# Patient Record
Sex: FEMALE | Race: WHITE | Employment: FULL TIME | URBAN - METROPOLITAN AREA
[De-identification: names, ages, dates, MRNs, and addresses within clinical notes are randomized per-mention and may not be internally consistent; named-entity substitution may affect disease eponyms.]

---

## 2017-01-17 RX ORDER — TOPIRAMATE 25 MG/1
TABLET ORAL
Qty: 270 TABLET | Refills: 1 | Status: SHIPPED | OUTPATIENT
Start: 2017-01-17 | End: 2017-07-13

## 2017-01-17 NOTE — TELEPHONE ENCOUNTER
Medication: Topirimate    Date of last refill: 10/24/16 # 270  [place]     Last office visit: 11/7/2016  Due back to clinic per last office note:  RTN in 3 months  Date next office visit scheduled:  Future Appointments

## 2017-02-01 ENCOUNTER — TELEPHONE (OUTPATIENT)
Dept: NEUROLOGY | Facility: CLINIC | Age: 47
End: 2017-02-01

## 2017-02-01 NOTE — TELEPHONE ENCOUNTER
Predetermination received from [facility] see encounter 1- no end date given. Called [employer] per Mark Smith benefits will have to be verified for 2-2017 and patient consent is needed before Botox can be shipped. Benefits verified started today.

## 2017-02-08 NOTE — TELEPHONE ENCOUNTER
Called [employer] and spoke with Libia Joya she will call patient today to get consent and co-pay and then will call me back to set up delivery.

## 2017-02-14 NOTE — TELEPHONE ENCOUNTER
Received Botox 1 200 unit valve. Lot # [id number] exp date 09/2019. RX# [id number]. Placed in refrigerator.  Appt schedule 02/15/17

## 2017-02-15 ENCOUNTER — OFFICE VISIT (OUTPATIENT)
Dept: NEUROLOGY | Facility: CLINIC | Age: 47
End: 2017-02-15

## 2017-02-15 VITALS
SYSTOLIC BLOOD PRESSURE: 124 MMHG | BODY MASS INDEX: 22.16 KG/M2 | RESPIRATION RATE: 16 BRPM | DIASTOLIC BLOOD PRESSURE: 60 MMHG | HEART RATE: 72 BPM | HEIGHT: 65 IN | WEIGHT: 133 LBS

## 2017-02-15 DIAGNOSIS — G43.709 CHRONIC MIGRAINE WITHOUT AURA WITHOUT STATUS MIGRAINOSUS, NOT INTRACTABLE: Primary | ICD-10-CM

## 2017-02-15 PROCEDURE — 64615 CHEMODENERV MUSC MIGRAINE: CPT | Performed by: OTHER

## 2017-02-15 NOTE — PATIENT INSTRUCTIONS
Refill policies:    • Allow 2 business days for refills; controlled substances may take longer.   • Contact your pharmacy at least 5 days prior to running out of medication and have them send an electronic request or submit request through the “request re your physician has recommended that you have a procedure or additional testing performed. [facility]) will contact your insurance carrier to obtain pre-certification or prior authorization.     Unfortunately, PAM has seen an increas

## 2017-02-15 NOTE — PROCEDURES
[employer] with [employer]  2/15/2017  CHRONIC MIGRAINE - BOTOX Injection  CPT: 69421    Beaulieu  is a(n) 50year old female with chronic migraine. Patient is here for Botox injection. Expect response to start on the second or going into the 3rd week  Use ice packs and Tylenol ES if with pain at injections sites  Report any signs of infection or inflammation at site of injection  Use migraine medications the same was as before    Naldo Conner

## 2017-04-24 ENCOUNTER — TELEPHONE (OUTPATIENT)
Dept: NEUROLOGY | Facility: CLINIC | Age: 47
End: 2017-04-24

## 2017-04-24 DIAGNOSIS — G43.709 CHRONIC MIGRAINE WITHOUT AURA WITHOUT STATUS MIGRAINOSUS, NOT INTRACTABLE: Primary | ICD-10-CM

## 2017-04-24 NOTE — TELEPHONE ENCOUNTER
Please send a prescription to [facility]  Pharmacy:    Botox 200 units, 3 refills      Si units to be administered by MD into multiple sites of head, neck and scalp every 3 months for chronic migraine prophylaxis. Appointment 6-.

## 2017-05-17 ENCOUNTER — OFFICE VISIT (OUTPATIENT)
Dept: NEUROLOGY | Facility: CLINIC | Age: 47
End: 2017-05-17

## 2017-05-17 VITALS — RESPIRATION RATE: 18 BRPM | SYSTOLIC BLOOD PRESSURE: 116 MMHG | DIASTOLIC BLOOD PRESSURE: 74 MMHG | HEART RATE: 72 BPM

## 2017-05-17 DIAGNOSIS — G43.709 CHRONIC MIGRAINE WITHOUT AURA WITHOUT STATUS MIGRAINOSUS, NOT INTRACTABLE: Primary | ICD-10-CM

## 2017-05-17 PROCEDURE — 64615 CHEMODENERV MUSC MIGRAINE: CPT | Performed by: OTHER

## 2017-05-17 RX ORDER — FROVATRIPTAN SUCCINATE 2.5 MG/1
TABLET, FILM COATED ORAL
Refills: 4 | COMMUNITY
Start: 2017-04-16 | End: 2017-11-10

## 2017-05-17 NOTE — PATIENT INSTRUCTIONS
Refill policies:    • Allow 2 business days for refills; controlled substances may take longer.   • Contact your pharmacy at least 5 days prior to running out of medication and have them send an electronic request or submit request through the “request re insurance carrier to obtain pre-certification or prior authorization. Unfortunately, PAM has seen an increase in denial of payment even though the procedure/test has been pre-certified.   You are strongly encouraged to contact your insurance carrier to v

## 2017-05-17 NOTE — PROGRESS NOTES
[facility] with [employer]  5/17/2017  CHRONIC MIGRAINE - BOTOX Injection  CPT: 82385    Parker Cummings is a(n) 50year old female with chronic migraine. Patient is here for Botox injection. Chronic migraine without aura without status migrainosus, not intractable  (primary encounter diagnosis)    Post injections instructions:  Expect response to start on the second or going into the 3rd week  Use ice packs and Tylenol ES if with pain at injec

## 2017-06-12 ENCOUNTER — LAB ENCOUNTER (OUTPATIENT)
Dept: LAB | Age: 47
End: 2017-06-12
Attending: INTERNAL MEDICINE
Payer: COMMERCIAL

## 2017-06-12 ENCOUNTER — TELEPHONE (OUTPATIENT)
Dept: NEUROLOGY | Facility: CLINIC | Age: 47
End: 2017-06-12

## 2017-06-12 DIAGNOSIS — L40.50 PSORIATIC ARTHRITIS (HCC): Primary | ICD-10-CM

## 2017-06-12 PROCEDURE — 85025 COMPLETE CBC W/AUTO DIFF WBC: CPT

## 2017-06-12 PROCEDURE — 80053 COMPREHEN METABOLIC PANEL: CPT

## 2017-07-13 RX ORDER — TOPIRAMATE 25 MG/1
TABLET ORAL
Qty: 270 TABLET | Refills: 1 | Status: SHIPPED | OUTPATIENT
Start: 2017-07-13 | End: 2017-12-23

## 2017-07-13 NOTE — TELEPHONE ENCOUNTER
Medication: Topiramate 25 mg  Date of last refill: 01/17/17 # 270 with 1 addt refill  [place]     Last office visit: 5/17/2017  Due back to clinic per last office note:  RTN in 3 months by 08/17/17  Date next office visi

## 2017-07-18 ENCOUNTER — TELEPHONE (OUTPATIENT)
Dept: SURGERY | Facility: CLINIC | Age: 47
End: 2017-07-18

## 2017-07-18 NOTE — TELEPHONE ENCOUNTER
For medical pre-d with [employer] need reduction in headaches since start of botox, as well as reduction inf frequency and intensity of headaches.

## 2017-07-19 NOTE — TELEPHONE ENCOUNTER
Botox Re-authorization Questionnaire:  Has the number of headache days per month decreased since Botox? Yes - decrease until she is due for next Botox   If yes, by what percentage? > 50 % improvement  Has the intensity of migraines decreased since Botox?  Annalee Flower

## 2017-08-02 NOTE — TELEPHONE ENCOUNTER
Spoke to rep [employer]. Needs pt consent    Tentatively delivery scheduled on 08/09/17.     Contacted pt and LMOM advising to contact [employer] to give consent

## 2017-08-02 NOTE — TELEPHONE ENCOUNTER
Received fax. Based on the documentation submitted, [id number] and [id number] for Botox injection for chronic migraine headaches are covered under the member's plan.

## 2017-08-14 NOTE — TELEPHONE ENCOUNTER
Called [employer]. Spoke to rep Carl Ang. Obtained pt consent and copay.  Scheduled delivery on 08/16/17 to [facility]

## 2017-08-16 NOTE — TELEPHONE ENCOUNTER
ONe 200 unit vial of Botox received in [facility]. Lot # [id number], exp March 2020. Pt has appt for 8/30/17. Botox placed in refrigerator.

## 2017-08-30 ENCOUNTER — OFFICE VISIT (OUTPATIENT)
Dept: NEUROLOGY | Facility: CLINIC | Age: 47
End: 2017-08-30

## 2017-08-30 VITALS
HEART RATE: 68 BPM | SYSTOLIC BLOOD PRESSURE: 110 MMHG | BODY MASS INDEX: 22.16 KG/M2 | RESPIRATION RATE: 16 BRPM | WEIGHT: 133 LBS | DIASTOLIC BLOOD PRESSURE: 60 MMHG | HEIGHT: 65 IN

## 2017-08-30 DIAGNOSIS — G43.709 CHRONIC MIGRAINE WITHOUT AURA WITHOUT STATUS MIGRAINOSUS, NOT INTRACTABLE: Primary | ICD-10-CM

## 2017-08-30 PROCEDURE — 64615 CHEMODENERV MUSC MIGRAINE: CPT | Performed by: OTHER

## 2017-08-30 NOTE — PROCEDURES
[employer] with [employer]  8/30/2017  CHRONIC MIGRAINE - BOTOX Injection  CPT: 56316    Jody Elizalde is a(n) 50year old female with chronic migraine. Patient is here for Botox injection. Discarded:  45 units          IMPRESSION:  Chronic migraine without aura without status migrainosus, not intractable  (primary encounter diagnosis)    Post injections instructions:  Expect response to start on the second or going into the 3rd week  Use ice

## 2017-08-30 NOTE — PATIENT INSTRUCTIONS
Refill policies:    • Allow 2-3 business days for refills; controlled substances may take longer.   • Contact your pharmacy at least 5 days prior to running out of medication and have them send an electronic request or submit request through the [facility] have a procedure or additional testing performed. [facility]) will contact your insurance carrier to obtain pre-certification or prior authorization.     Unfortunately, PAM has seen an increase in denial of payment even though the p

## 2017-11-10 DIAGNOSIS — G43.709 CHRONIC MIGRAINE WITHOUT AURA WITHOUT STATUS MIGRAINOSUS, NOT INTRACTABLE: ICD-10-CM

## 2017-11-10 DIAGNOSIS — G43.709 CHRONIC MIGRAINE WITHOUT AURA WITHOUT STATUS MIGRAINOSUS, NOT INTRACTABLE: Primary | ICD-10-CM

## 2017-11-10 RX ORDER — FROVATRIPTAN SUCCINATE 2.5 MG/1
TABLET, FILM COATED ORAL
Qty: 12 TABLET | Refills: 1 | Status: SHIPPED | OUTPATIENT
Start: 2017-11-10 | End: 2017-11-10

## 2017-11-10 NOTE — TELEPHONE ENCOUNTER
Medication: Frova    Date of last refill: 4/16/2017  [place] medication    Last office visit: 8/30/2017  Due back to clinic per last office note:  RTC for Botox  Date next office visit scheduled:  Future Appo

## 2017-11-13 RX ORDER — FROVATRIPTAN SUCCINATE 2.5 MG/1
TABLET, FILM COATED ORAL
Qty: 36 TABLET | Refills: 1 | Status: SHIPPED | OUTPATIENT
Start: 2017-11-13 | End: 2018-09-14

## 2017-11-21 ENCOUNTER — TELEPHONE (OUTPATIENT)
Dept: SURGERY | Facility: CLINIC | Age: 47
End: 2017-11-21

## 2017-11-21 NOTE — TELEPHONE ENCOUNTER
Called . Spoke to rep [employer]. Started process to verify benefits and once completed will contact patient for consent to schedule delivery.   Call duration 21:29    Also contacted Lilibeth Perez with [employer] to put a rush on verification of benefits to schedule deli

## 2017-11-27 NOTE — TELEPHONE ENCOUNTER
Called [employer]. Spoke to [employer]. Scheduled for delivery on 11/28/17 to [facility]. Confirmed delivery address.   Call duration 11:33

## 2017-12-04 ENCOUNTER — OFFICE VISIT (OUTPATIENT)
Dept: NEUROLOGY | Facility: CLINIC | Age: 47
End: 2017-12-04

## 2017-12-04 VITALS
RESPIRATION RATE: 16 BRPM | HEIGHT: 65 IN | HEART RATE: 77 BPM | WEIGHT: 137 LBS | BODY MASS INDEX: 22.82 KG/M2 | SYSTOLIC BLOOD PRESSURE: 113 MMHG | DIASTOLIC BLOOD PRESSURE: 64 MMHG

## 2017-12-04 DIAGNOSIS — G43.709 CHRONIC MIGRAINE WITHOUT AURA WITHOUT STATUS MIGRAINOSUS, NOT INTRACTABLE: Primary | ICD-10-CM

## 2017-12-04 PROCEDURE — 64615 CHEMODENERV MUSC MIGRAINE: CPT | Performed by: OTHER

## 2017-12-04 RX ORDER — LIOTHYRONINE SODIUM 25 UG/1
12.5 TABLET ORAL DAILY
COMMUNITY
End: 2020-07-29

## 2017-12-04 RX ORDER — FOLIC ACID 1 MG/1
2 TABLET ORAL DAILY
COMMUNITY
End: 2018-07-05

## 2017-12-04 NOTE — PATIENT INSTRUCTIONS
Refill policies:    • Allow 2-3 business days for refills; controlled substances may take longer.   • Contact your pharmacy at least 5 days prior to running out of medication and have them send an electronic request or submit request through the [facility] have a procedure or additional testing performed. [facility]) will contact your insurance carrier to obtain pre-certification or prior authorization.     Unfortunately, PAM has seen an increase in denial of payment even though the p

## 2017-12-04 NOTE — PROCEDURES
[employer] with [employer]  12/4/2017  CHRONIC MIGRAINE - BOTOX Injection  CPT: 39546    Tammi Jade is a(n) 50year old female with chronic migraine. Patient is here for Botox injection. Discarded:  35 units          IMPRESSION:  Chronic migraine without aura without status migrainosus, not intractable  (primary encounter diagnosis)    Post injections instructions:  Expect response to start on the second or going into the 3rd week  Use ice

## 2017-12-11 PROCEDURE — 87624 HPV HI-RISK TYP POOLED RSLT: CPT | Performed by: OBSTETRICS & GYNECOLOGY

## 2017-12-11 PROCEDURE — 88175 CYTOPATH C/V AUTO FLUID REDO: CPT | Performed by: OBSTETRICS & GYNECOLOGY

## 2017-12-18 ENCOUNTER — LAB ENCOUNTER (OUTPATIENT)
Dept: LAB | Age: 47
End: 2017-12-18
Attending: INTERNAL MEDICINE
Payer: COMMERCIAL

## 2017-12-18 DIAGNOSIS — L40.50 PSORIATIC ARTHROPATHY (HCC): Primary | ICD-10-CM

## 2017-12-18 PROCEDURE — 85025 COMPLETE CBC W/AUTO DIFF WBC: CPT

## 2017-12-18 PROCEDURE — 81291 MTHFR GENE: CPT

## 2017-12-18 PROCEDURE — 80053 COMPREHEN METABOLIC PANEL: CPT

## 2017-12-26 RX ORDER — TOPIRAMATE 25 MG/1
TABLET ORAL
Qty: 270 TABLET | Refills: 0 | Status: SHIPPED | OUTPATIENT
Start: 2017-12-26 | End: 2018-04-21

## 2018-02-20 ENCOUNTER — TELEPHONE (OUTPATIENT)
Dept: SURGERY | Facility: CLINIC | Age: 48
End: 2018-02-20

## 2018-02-20 DIAGNOSIS — G43.709 CHRONIC MIGRAINE WITHOUT AURA WITHOUT STATUS MIGRAINOSUS, NOT INTRACTABLE: Primary | ICD-10-CM

## 2018-02-20 NOTE — TELEPHONE ENCOUNTER
Please send a prescription to [facility]:    Botox 200 units, 3 refills      Si units to be administered by MD into multiple sites of head, neck and scalp every 3 months for chronic migraine prophylaxis.      Need by date 18

## 2018-02-26 ENCOUNTER — TELEPHONE (OUTPATIENT)
Dept: SURGERY | Facility: CLINIC | Age: 48
End: 2018-02-26

## 2018-02-26 NOTE — TELEPHONE ENCOUNTER
Justo Delacruz from [employer] and said that this is setup for delivery they just need copay and consent. I called patient left a message.     I called [facility] and spoke with [facility] OF [place] he said patient gave half of the co-pay but not consent, she needs to call back t

## 2018-02-27 NOTE — TELEPHONE ENCOUNTER
Called [facility] and spoke with Sally Half patient has given consent and she will have this shipped out today for deliver to the [place] office on 2/28/18. Confirmed address and hours.

## 2018-03-07 ENCOUNTER — OFFICE VISIT (OUTPATIENT)
Dept: NEUROLOGY | Facility: CLINIC | Age: 48
End: 2018-03-07

## 2018-03-07 VITALS
DIASTOLIC BLOOD PRESSURE: 62 MMHG | HEART RATE: 80 BPM | SYSTOLIC BLOOD PRESSURE: 121 MMHG | WEIGHT: 136 LBS | HEIGHT: 66 IN | BODY MASS INDEX: 21.86 KG/M2 | RESPIRATION RATE: 16 BRPM

## 2018-03-07 DIAGNOSIS — G43.709 CHRONIC MIGRAINE WITHOUT AURA WITHOUT STATUS MIGRAINOSUS, NOT INTRACTABLE: Primary | ICD-10-CM

## 2018-03-07 PROCEDURE — 64615 CHEMODENERV MUSC MIGRAINE: CPT | Performed by: OTHER

## 2018-03-07 NOTE — PROCEDURES
[employer] with [employer]  3/7/2018  CHRONIC MIGRAINE - BOTOX Injection  CPT: 22761    Brain Corrina is a(n) 46year old female with chronic migraine. Patient is here for Botox injection. Discarded:  45 units          IMPRESSION:  Chronic migraine without aura without status migrainosus, not intractable  (primary encounter diagnosis)    Post injections instructions:  Expect response to start on the second or going into the 3rd week  Use ice

## 2018-03-07 NOTE — PATIENT INSTRUCTIONS
Refill policies:    • Allow 2-3 business days for refills; controlled substances may take longer.   • Contact your pharmacy at least 5 days prior to running out of medication and have them send an electronic request or submit request through the [facility] recommended that you have a procedure or additional testing performed. [facility]) will contact your insurance carrier to obtain pre-certification or prior authorization.     Unfortunately, [facility] has seen an increase in denial of paym

## 2018-04-23 RX ORDER — TOPIRAMATE 25 MG/1
TABLET ORAL
Qty: 270 TABLET | Refills: 3 | Status: SHIPPED | OUTPATIENT
Start: 2018-04-23 | End: 2019-05-02

## 2018-06-06 ENCOUNTER — TELEPHONE (OUTPATIENT)
Dept: SURGERY | Facility: CLINIC | Age: 48
End: 2018-06-06

## 2018-06-06 NOTE — TELEPHONE ENCOUNTER
Called [facility] and spoke with [place] Botox will be deliver to [place] on 6/7/18. Patient has a zero copay and has given a blanket consent. Confirmed address and hours.

## 2018-06-07 NOTE — TELEPHONE ENCOUNTER
Had received botox 200 units Lot #[id number] Exp Date 12/2020  Rx # [id number]  Patient has an appointment on 06/11/2018

## 2018-06-11 ENCOUNTER — OFFICE VISIT (OUTPATIENT)
Dept: NEUROLOGY | Facility: CLINIC | Age: 48
End: 2018-06-11

## 2018-06-11 VITALS
HEIGHT: 66 IN | SYSTOLIC BLOOD PRESSURE: 120 MMHG | BODY MASS INDEX: 21.86 KG/M2 | RESPIRATION RATE: 16 BRPM | DIASTOLIC BLOOD PRESSURE: 68 MMHG | WEIGHT: 136 LBS | HEART RATE: 77 BPM

## 2018-06-11 DIAGNOSIS — G43.709 CHRONIC MIGRAINE WITHOUT AURA WITHOUT STATUS MIGRAINOSUS, NOT INTRACTABLE: Primary | ICD-10-CM

## 2018-06-11 PROCEDURE — 64615 CHEMODENERV MUSC MIGRAINE: CPT | Performed by: OTHER

## 2018-06-11 RX ORDER — LEVOCETIRIZINE DIHYDROCHLORIDE 5 MG/1
5 TABLET, FILM COATED ORAL EVERY EVENING
COMMUNITY

## 2018-06-11 NOTE — PATIENT INSTRUCTIONS
Refill policies:    • Allow 2-3 business days for refills; controlled substances may take longer.   • Contact your pharmacy at least 5 days prior to running out of medication and have them send an electronic request or submit request through the “request re entire amount billed. Precertification and Prior Authorizations: If your physician has recommended that you have a procedure or additional testing performed.   [facility]) will contact your insurance carrier to obtain pre-certi numerical 0-10

## 2018-06-11 NOTE — PROCEDURES
[facility] with [employer]  6/11/2018  CHRONIC MIGRAINE - BOTOX Injection  CPT: 88837    Selene Mccracken is a(n) 46year old female with chronic migraine. Patient is here for Botox injection. Discarded:  45 units          IMPRESSION:  Chronic migraine without aura without status migrainosus, not intractable  (primary encounter diagnosis)    Post injections instructions:  Expect response to start on the second or going into the 3rd week  Use ice

## 2018-06-12 ENCOUNTER — TELEPHONE (OUTPATIENT)
Dept: NEUROLOGY | Facility: CLINIC | Age: 48
End: 2018-06-12

## 2018-06-12 DIAGNOSIS — G43.709 CHRONIC MIGRAINE WITHOUT AURA WITHOUT STATUS MIGRAINOSUS, NOT INTRACTABLE: Primary | ICD-10-CM

## 2018-06-12 NOTE — TELEPHONE ENCOUNTER
At office visit patient completed paperwork to initiate Neville Doherty.     PA initiated, see referral.

## 2018-06-12 NOTE — TELEPHONE ENCOUNTER
Received notification from [employer]: this product does not require authorization at this time, and may be covered at the pharmacy in accordance with your benefit plan. Start form for Aimovig faxed to [address] [place] [address], [facility] rec'd. Paperwork to file.  Epic upd

## 2018-06-19 PROCEDURE — 87624 HPV HI-RISK TYP POOLED RSLT: CPT | Performed by: OBSTETRICS & GYNECOLOGY

## 2018-06-19 PROCEDURE — 88175 CYTOPATH C/V AUTO FLUID REDO: CPT | Performed by: OBSTETRICS & GYNECOLOGY

## 2018-06-26 PROBLEM — L40.50 PSORIATIC ARTHRITIS (HCC): Status: ACTIVE | Noted: 2018-06-26

## 2018-07-06 ENCOUNTER — TELEPHONE (OUTPATIENT)
Dept: NEUROLOGY | Facility: CLINIC | Age: 48
End: 2018-07-06

## 2018-07-06 DIAGNOSIS — G43.709 CHRONIC MIGRAINE WITHOUT AURA WITHOUT STATUS MIGRAINOSUS, NOT INTRACTABLE: Primary | ICD-10-CM

## 2018-07-06 RX ORDER — ERENUMAB-AOOE 70 MG/ML
70 INJECTION SUBCUTANEOUS
Qty: 1 PEN | Refills: 11 | Status: SHIPPED | OUTPATIENT
Start: 2018-07-06 | End: 2019-06-07 | Stop reason: DRUGHIGH

## 2018-07-27 ENCOUNTER — TELEPHONE (OUTPATIENT)
Dept: NEUROLOGY | Facility: CLINIC | Age: 48
End: 2018-07-27

## 2018-07-27 NOTE — TELEPHONE ENCOUNTER
Received a call from [facility] to verify diagnosis code for Botox therapy. Diagnosis code provided. They inquired if medication has an active PA on file and if not, please fax clinicals to [phone]. Will forward inquiry to referrals coordinator.

## 2018-07-30 PROCEDURE — 83001 ASSAY OF GONADOTROPIN (FSH): CPT | Performed by: OBSTETRICS & GYNECOLOGY

## 2018-07-30 PROCEDURE — 82670 ASSAY OF TOTAL ESTRADIOL: CPT | Performed by: OBSTETRICS & GYNECOLOGY

## 2018-07-30 PROCEDURE — 83002 ASSAY OF GONADOTROPIN (LH): CPT | Performed by: OBSTETRICS & GYNECOLOGY

## 2018-09-04 ENCOUNTER — TELEPHONE (OUTPATIENT)
Dept: SURGERY | Facility: CLINIC | Age: 48
End: 2018-09-04

## 2018-09-04 NOTE — TELEPHONE ENCOUNTER
Called [facility] and spoke with verónica to confirmed Botox goes through [facility], he check and yes it does. Call reference #[id number]. Time on call 16:43    Called [facility] and spoke with [facility] she sent this as a stat I can call back in 24 hours for delivery.

## 2018-09-05 ENCOUNTER — TELEPHONE (OUTPATIENT)
Dept: SURGERY | Facility: CLINIC | Age: 48
End: 2018-09-05

## 2018-09-05 NOTE — TELEPHONE ENCOUNTER
Yaakov Bowers will hold off on Botox at this time as her headaches are well controlled. Appointment cancelled and she will call for future appointment to assess headache control.

## 2018-09-05 NOTE — TELEPHONE ENCOUNTER
I called patient to call [facility] and give consent by noon today to be able to have the Botox delivered in time for her 9/12/18 visit. She said that she was not sure if she should have the Botox because the Scott Dunlap is helping control her headaches.  I exp

## 2018-09-14 DIAGNOSIS — G43.709 CHRONIC MIGRAINE WITHOUT AURA WITHOUT STATUS MIGRAINOSUS, NOT INTRACTABLE: ICD-10-CM

## 2018-09-17 DIAGNOSIS — G43.709 CHRONIC MIGRAINE WITHOUT AURA WITHOUT STATUS MIGRAINOSUS, NOT INTRACTABLE: ICD-10-CM

## 2018-09-17 RX ORDER — FROVATRIPTAN SUCCINATE 2.5 MG/1
TABLET, FILM COATED ORAL
Qty: 180 TABLET | Refills: 0 | OUTPATIENT
Start: 2018-09-17

## 2018-09-17 RX ORDER — FROVATRIPTAN SUCCINATE 2.5 MG/1
TABLET, FILM COATED ORAL
Qty: 36 TABLET | Refills: 0 | Status: SHIPPED | OUTPATIENT
Start: 2018-09-17 | End: 2019-01-27

## 2018-09-17 NOTE — TELEPHONE ENCOUNTER
Medication: Frovatriptan 2.5 g    Date of last refill: 11/13/17 with 1 addt refills # 36  [place]     Last office visit: 6/11/2018  Due back to clinic per last office note: RTN in 3 months  Date next office visit schedul

## 2018-12-12 ENCOUNTER — OFFICE VISIT (OUTPATIENT)
Dept: NEUROLOGY | Facility: CLINIC | Age: 48
End: 2018-12-12
Payer: COMMERCIAL

## 2018-12-12 VITALS
HEART RATE: 78 BPM | SYSTOLIC BLOOD PRESSURE: 102 MMHG | DIASTOLIC BLOOD PRESSURE: 60 MMHG | WEIGHT: 135 LBS | HEIGHT: 66 IN | BODY MASS INDEX: 21.69 KG/M2 | RESPIRATION RATE: 16 BRPM

## 2018-12-12 DIAGNOSIS — G43.709 CHRONIC MIGRAINE WITHOUT AURA WITHOUT STATUS MIGRAINOSUS, NOT INTRACTABLE: Primary | ICD-10-CM

## 2018-12-12 PROCEDURE — 99213 OFFICE O/P EST LOW 20 MIN: CPT | Performed by: OTHER

## 2018-12-12 NOTE — PATIENT INSTRUCTIONS
Refill policies:    • Allow 2-3 business days for refills; controlled substances may take longer.   • Contact your pharmacy at least 5 days prior to running out of medication and have them send an electronic request or submit request through the “request re entire amount billed. Precertification and Prior Authorizations: If your physician has recommended that you have a procedure or additional testing performed.   [facility]) will contact your insurance carrier to obtain pre-certi

## 2018-12-13 NOTE — PROGRESS NOTES
[facility] with [address]  1/22/1970  Primary Care Provider:  Kristel Pineda    12/12/2018  Accompanied visit:  (x) No ( ) yes, by:      46year old yo patient being seen for: nails Monday through Friday BID, Disp: 60 g, Rfl: 3  •  Calcipotriene 0.005 % External Ointment, Apply to nails daily Saturday and Sunday. , Disp: 60 g, Rfl: 3  •  Bimatoprost 0.03 % External Solution, Place into both eyes nightly., Disp: , Rfl:   •  Naprox migrainosus, not intractable  (primary encounter diagnosis)    Discussion on the case:  Continue AIMOVIG    Diagnostics/Orders:  same      (x) Discussed potential side effects of any treatment relevant to above.     After visit check out process by PSRs and

## 2019-01-27 DIAGNOSIS — G43.709 CHRONIC MIGRAINE WITHOUT AURA WITHOUT STATUS MIGRAINOSUS, NOT INTRACTABLE: ICD-10-CM

## 2019-01-28 RX ORDER — FROVATRIPTAN SUCCINATE 2.5 MG/1
TABLET, FILM COATED ORAL
Qty: 36 TABLET | Refills: 1 | Status: SHIPPED | OUTPATIENT
Start: 2019-01-28 | End: 2019-05-02

## 2019-01-28 NOTE — TELEPHONE ENCOUNTER
Medication: Frovatriptan 2.5 mg    Date of last refill:09/17/18  [place]     Last office visit: 12/12/2018  Due back to clinic per last office note:  RTN in 6 months  Date next office visit scheduled:    Future Appointme

## 2019-05-02 DIAGNOSIS — G43.709 CHRONIC MIGRAINE WITHOUT AURA WITHOUT STATUS MIGRAINOSUS, NOT INTRACTABLE: ICD-10-CM

## 2019-05-02 RX ORDER — FROVATRIPTAN SUCCINATE 2.5 MG/1
TABLET, FILM COATED ORAL
Qty: 36 TABLET | Refills: 1 | Status: SHIPPED | OUTPATIENT
Start: 2019-05-02 | End: 2020-06-15

## 2019-05-02 RX ORDER — TOPIRAMATE 25 MG/1
TABLET ORAL
Qty: 270 TABLET | Refills: 1 | Status: SHIPPED | OUTPATIENT
Start: 2019-05-02 | End: 2019-10-29

## 2019-05-03 RX ORDER — FROVATRIPTAN SUCCINATE 2.5 MG/1
TABLET, FILM COATED ORAL
Qty: 180 TABLET | Refills: 1 | OUTPATIENT
Start: 2019-05-03

## 2019-06-05 ENCOUNTER — OFFICE VISIT (OUTPATIENT)
Dept: NEUROLOGY | Facility: CLINIC | Age: 49
End: 2019-06-05
Payer: COMMERCIAL

## 2019-06-05 VITALS
HEIGHT: 66 IN | DIASTOLIC BLOOD PRESSURE: 60 MMHG | SYSTOLIC BLOOD PRESSURE: 117 MMHG | BODY MASS INDEX: 21.69 KG/M2 | HEART RATE: 77 BPM | RESPIRATION RATE: 16 BRPM | WEIGHT: 135 LBS

## 2019-06-05 DIAGNOSIS — M79.18 CERVICAL MYOFASCIAL PAIN SYNDROME: ICD-10-CM

## 2019-06-05 DIAGNOSIS — G43.709 CHRONIC MIGRAINE WITHOUT AURA WITHOUT STATUS MIGRAINOSUS, NOT INTRACTABLE: Primary | ICD-10-CM

## 2019-06-05 PROCEDURE — 99214 OFFICE O/P EST MOD 30 MIN: CPT | Performed by: OTHER

## 2019-06-05 NOTE — PROGRESS NOTES
[facility] with [facility]  Andrews Oro  1/22/1970  Primary Care Provider:  Saurabh Santana    6/5/2019  Accompanied visit:      (x) No.        44year old yo patient being seen for: •  Liothyronine Sodium 25 MCG Oral Tab, Take 12.5 mcg by mouth daily. , Disp: , Rfl:   •  Clobetasol Propionate 0.05 % External Ointment, Apply to nails Monday through Friday BID, Disp: 60 g, Rfl: 3  •  Calcipotriene 0.005 % External Ointment, Apply to na syndrome    Discussion plus Diagnostics & Treatment Orders:  Orders Placed This Encounter      Bimatoprost (LATISSE EX)          Sig: Apply topically.       Diclofenac Sodium (VOLTAREN) 1 % Transdermal Gel          Sig: Apply 2 g topically 4 (four) times da

## 2019-06-07 ENCOUNTER — TELEPHONE (OUTPATIENT)
Dept: NEUROLOGY | Facility: CLINIC | Age: 49
End: 2019-06-07

## 2019-06-07 DIAGNOSIS — G43.709 CHRONIC MIGRAINE WITHOUT AURA WITHOUT STATUS MIGRAINOSUS, NOT INTRACTABLE: Primary | ICD-10-CM

## 2019-06-07 NOTE — TELEPHONE ENCOUNTER
Aliyah Perez reports that she needs her Aimovig to be sent to [facility], not local.    Script forwarded as requested.

## 2019-07-31 NOTE — TELEPHONE ENCOUNTER
Received Fax from [employer] on AIMOVIG 140 MG/ML   Case #:[id number]      Your request for Aimovig 140 mg/ml auto injectors has been received by [employer], your pharmacy benefit manager.      This product does not require prior authorization

## 2019-09-10 PROCEDURE — 87624 HPV HI-RISK TYP POOLED RSLT: CPT | Performed by: OBSTETRICS & GYNECOLOGY

## 2019-09-10 PROCEDURE — 88175 CYTOPATH C/V AUTO FLUID REDO: CPT | Performed by: OBSTETRICS & GYNECOLOGY

## 2019-10-29 NOTE — TELEPHONE ENCOUNTER
Medication: Topiramate 25 mg    Date of last refill: 0502/2019 with 1 addt refill # 270      Last office visit: 6/5/2019  Due back to clinic per last office note: RTN in 6 months  Date next office visit scheduled:    Future Appointments   Date Time Provide

## 2019-10-30 RX ORDER — TOPIRAMATE 25 MG/1
TABLET ORAL
Qty: 270 TABLET | Refills: 1 | Status: SHIPPED | OUTPATIENT
Start: 2019-10-30 | End: 2020-04-29

## 2019-12-10 ENCOUNTER — OFFICE VISIT (OUTPATIENT)
Dept: NEUROLOGY | Facility: CLINIC | Age: 49
End: 2019-12-10
Payer: COMMERCIAL

## 2019-12-10 VITALS
RESPIRATION RATE: 16 BRPM | BODY MASS INDEX: 21.69 KG/M2 | WEIGHT: 135 LBS | HEIGHT: 66 IN | HEART RATE: 70 BPM | DIASTOLIC BLOOD PRESSURE: 59 MMHG | SYSTOLIC BLOOD PRESSURE: 113 MMHG

## 2019-12-10 DIAGNOSIS — M79.18 CERVICAL MYOFASCIAL PAIN SYNDROME: Primary | ICD-10-CM

## 2019-12-10 DIAGNOSIS — G43.709 CHRONIC MIGRAINE WITHOUT AURA WITHOUT STATUS MIGRAINOSUS, NOT INTRACTABLE: ICD-10-CM

## 2019-12-10 PROCEDURE — 99213 OFFICE O/P EST LOW 20 MIN: CPT | Performed by: OTHER

## 2019-12-10 PROCEDURE — 96372 THER/PROPH/DIAG INJ SC/IM: CPT | Performed by: OTHER

## 2019-12-10 RX ORDER — LIDOCAINE HYDROCHLORIDE 10 MG/ML
5 INJECTION, SOLUTION INFILTRATION; PERINEURAL ONCE
Status: COMPLETED | OUTPATIENT
Start: 2019-12-10 | End: 2019-12-10

## 2019-12-10 RX ORDER — LIDOCAINE HYDROCHLORIDE 10 MG/ML
5 INJECTION, SOLUTION INFILTRATION; PERINEURAL ONCE
Status: DISCONTINUED | OUTPATIENT
Start: 2019-12-10 | End: 2019-12-10

## 2019-12-10 RX ORDER — TRIAMCINOLONE ACETONIDE 40 MG/ML
40 INJECTION, SUSPENSION INTRA-ARTICULAR; INTRAMUSCULAR ONCE
Status: COMPLETED | OUTPATIENT
Start: 2019-12-10 | End: 2019-12-10

## 2019-12-11 NOTE — PROGRESS NOTES
[facility] with [facility]  Sandra Parent  1/22/1970  Primary Care Provider:  Mahendra Jones    12/10/2019  Accompanied visit:      (x) No.        44year old yo patient being seen for the skin every 30 (thirty) days.   , Disp: , Rfl:   •  LEVOTHYROXINE SODIUM 100 MCG Oral Tab, TAKE 1 TABLET BY MOUTH EVERY DAY AS DIRECTED, Disp: 90 tablet, Rfl: 0  •  Levocetirizine Dihydrochloride 5 MG Oral Tab, Take 5 mg by mouth every evening., Disp: , PM      RELEVANT LABS and other DATA reviewed.        Problem/s Identified this visit:   Cervical myofascial pain syndrome  (primary encounter diagnosis)    Discussion plus Diagnostics & Treatment Orders:  Advised that can continue Aimovig and start BOTOX \

## 2020-01-02 ENCOUNTER — TELEPHONE (OUTPATIENT)
Dept: NEUROLOGY | Facility: CLINIC | Age: 50
End: 2020-01-02

## 2020-01-02 DIAGNOSIS — G43.709 CHRONIC MIGRAINE WITHOUT AURA WITHOUT STATUS MIGRAINOSUS, NOT INTRACTABLE: Primary | ICD-10-CM

## 2020-01-02 NOTE — TELEPHONE ENCOUNTER
Prior authorization request received from [employer]. Prior authorization initiated. Case Key# [id number] pending. CaseId:[id number];Status:Approved; Review Type:Prior Auth; Coverage Start Date:01/01/2020; Coverage End Date:01/30/2021

## 2020-01-06 NOTE — TELEPHONE ENCOUNTER
Pt called to have Aimovig rx transferred to [facility]. Pt contacted [facility] and they told her they could fill it since they have supply. Pt would like it filled for 30 day only. Call pt if there are any questions.

## 2020-01-06 NOTE — TELEPHONE ENCOUNTER
Previous Rx sent to [facility] on 12/31/19 for a 90-day supply x3 refills. Pt would like to  a 1 month supply locally. New Rx signed as written order for 1 month supply.

## 2020-04-29 DIAGNOSIS — G43.709 CHRONIC MIGRAINE WITHOUT AURA WITHOUT STATUS MIGRAINOSUS, NOT INTRACTABLE: Primary | ICD-10-CM

## 2020-04-29 RX ORDER — TOPIRAMATE 25 MG/1
TABLET ORAL
Qty: 270 TABLET | Refills: 1 | Status: SHIPPED | OUTPATIENT
Start: 2020-04-29 | End: 2020-10-19

## 2020-04-29 NOTE — TELEPHONE ENCOUNTER
Medication: Topiramate    Date of last refill: 10/30/2019 (#270/1)  [place] medication    Last office visit: 12/10/2019  Due back to clinic per last office note:  RTC in 3 months  Date next office visit sched

## 2020-06-15 ENCOUNTER — TELEPHONE (OUTPATIENT)
Dept: NEUROLOGY | Facility: CLINIC | Age: 50
End: 2020-06-15

## 2020-06-15 DIAGNOSIS — G43.709 CHRONIC MIGRAINE WITHOUT AURA WITHOUT STATUS MIGRAINOSUS, NOT INTRACTABLE: ICD-10-CM

## 2020-06-15 RX ORDER — FROVATRIPTAN SUCCINATE 2.5 MG/1
TABLET, FILM COATED ORAL
Qty: 36 TABLET | Refills: 1 | Status: SHIPPED | OUTPATIENT
Start: 2020-06-15 | End: 2020-12-28

## 2020-06-15 NOTE — TELEPHONE ENCOUNTER
Medication: frovatriptan    Date of last refill: 5/2/19  [place]    Last office visit: 12/10/19  Due back to clinic per last office note:  Not addressed  Date next office visit scheduled:    Future Appointments   Date

## 2020-07-21 ENCOUNTER — OFFICE VISIT (OUTPATIENT)
Dept: NEUROLOGY | Facility: CLINIC | Age: 50
End: 2020-07-21
Payer: COMMERCIAL

## 2020-07-21 VITALS
RESPIRATION RATE: 16 BRPM | HEART RATE: 67 BPM | HEIGHT: 66 IN | WEIGHT: 135 LBS | TEMPERATURE: 98 F | BODY MASS INDEX: 21.69 KG/M2 | DIASTOLIC BLOOD PRESSURE: 64 MMHG | SYSTOLIC BLOOD PRESSURE: 117 MMHG

## 2020-07-21 DIAGNOSIS — M79.18 CERVICAL MYOFASCIAL PAIN SYNDROME: ICD-10-CM

## 2020-07-21 DIAGNOSIS — G43.709 CHRONIC MIGRAINE WITHOUT AURA WITHOUT STATUS MIGRAINOSUS, NOT INTRACTABLE: Primary | ICD-10-CM

## 2020-07-21 PROCEDURE — 3078F DIAST BP <80 MM HG: CPT | Performed by: OTHER

## 2020-07-21 PROCEDURE — 3008F BODY MASS INDEX DOCD: CPT | Performed by: OTHER

## 2020-07-21 PROCEDURE — 3074F SYST BP LT 130 MM HG: CPT | Performed by: OTHER

## 2020-07-21 PROCEDURE — 99213 OFFICE O/P EST LOW 20 MIN: CPT | Performed by: OTHER

## 2020-07-21 RX ORDER — HYDROXYCHLOROQUINE SULFATE 200 MG/1
200 TABLET, FILM COATED ORAL DAILY
COMMUNITY
End: 2021-10-19

## 2020-07-21 NOTE — PROGRESS NOTES
[facility] with [facility]  Kassi Moncada  1/22/1970  Primary Care Provider:  Roxine Hatchet    7/21/2020  Accompanied visit:      (x) No.        44year old yo patient being seen for: Rfl: 4  •  Bimatoprost (LATISSE EX), Apply topically. , Disp: , Rfl:   •  Diclofenac Sodium (VOLTAREN) 1 % Transdermal Gel, Apply 2 g topically 4 (four) times daily. , Disp: 1 Tube, Rfl: 5  •  VALACYCLOVIR HCL 1 G Oral Tab, TAKE 1 TABLET(1000 MG) BY MOUTH EV BMI 21.79 kg/m²   Looks stated age  [address] to relate events with fluent speech and intact comprehension  CN 2-12 OK  No motor and sensory focal deficit  Gait and coordination OK  Reflexes symmetric and no pathologic reflexes seen          RELE

## 2020-09-24 PROBLEM — M75.42 IMPINGEMENT SYNDROME OF LEFT SHOULDER: Status: ACTIVE | Noted: 2020-09-24

## 2020-09-25 DIAGNOSIS — G43.709 CHRONIC MIGRAINE WITHOUT AURA WITHOUT STATUS MIGRAINOSUS, NOT INTRACTABLE: ICD-10-CM

## 2020-09-25 RX ORDER — ERENUMAB-AOOE 140 MG/ML
INJECTION, SOLUTION SUBCUTANEOUS
Qty: 3 ML | Refills: 3 | Status: SHIPPED | OUTPATIENT
Start: 2020-09-25 | End: 2021-09-09

## 2020-09-25 NOTE — TELEPHONE ENCOUNTER
Medication: Aimovig 140 mg    Date of last refill: 12/31/2019 with 3 addt refills  [place]     Last office visit: 7/21/2020  Due back to clinic per last office note:  RTN in 3 months  Date next office visit scheduled:

## 2020-10-19 DIAGNOSIS — G43.709 CHRONIC MIGRAINE WITHOUT AURA WITHOUT STATUS MIGRAINOSUS, NOT INTRACTABLE: ICD-10-CM

## 2020-10-19 RX ORDER — TOPIRAMATE 25 MG/1
TABLET ORAL
Qty: 270 TABLET | Refills: 1 | Status: SHIPPED | OUTPATIENT
Start: 2020-10-19 | End: 2021-04-26

## 2020-10-19 NOTE — TELEPHONE ENCOUNTER
Medication: Topiramate 25 mg     Date of last refill: 04/29/20 with 1 addt refill  [place]      Last office visit: 7/21/2020  Due back to clinic per last office note:  RTN in 3 months  Date next office visit scheduled:

## 2020-11-09 PROBLEM — M75.02 ADHESIVE CAPSULITIS OF LEFT SHOULDER: Status: ACTIVE | Noted: 2020-11-09

## 2020-11-23 PROBLEM — Z47.89 ORTHOPEDIC AFTERCARE: Status: ACTIVE | Noted: 2020-11-23

## 2020-12-28 DIAGNOSIS — G43.709 CHRONIC MIGRAINE WITHOUT AURA WITHOUT STATUS MIGRAINOSUS, NOT INTRACTABLE: ICD-10-CM

## 2020-12-28 RX ORDER — FROVATRIPTAN SUCCINATE 2.5 MG/1
TABLET, FILM COATED ORAL
Qty: 36 TABLET | Refills: 1 | Status: SHIPPED | OUTPATIENT
Start: 2020-12-28 | End: 2021-10-06

## 2020-12-29 RX ORDER — HYDROCODONE BITARTRATE AND ACETAMINOPHEN 5; 325 MG/1; MG/1
TABLET ORAL
Qty: 30 TABLET | Refills: 0 | Status: SHIPPED | OUTPATIENT
Start: 2020-12-29

## 2020-12-29 NOTE — TELEPHONE ENCOUNTER
From: Katelyn Bustamante  To:  Office of Chris Holt MD  Sent: 12/28/2020 4:51 PM CST  Subject: Medication Renewal Request    Refills have been requested for the following medications:     HYDROcodone-acetaminophen (NORCO) 5-325 MG Oral Tab [Taco Castro

## 2021-02-16 ENCOUNTER — OFFICE VISIT (OUTPATIENT)
Dept: NEUROLOGY | Facility: CLINIC | Age: 51
End: 2021-02-16
Payer: COMMERCIAL

## 2021-02-16 VITALS
HEART RATE: 66 BPM | SYSTOLIC BLOOD PRESSURE: 118 MMHG | BODY MASS INDEX: 22.82 KG/M2 | HEIGHT: 66 IN | DIASTOLIC BLOOD PRESSURE: 60 MMHG | WEIGHT: 142 LBS | RESPIRATION RATE: 16 BRPM

## 2021-02-16 DIAGNOSIS — M79.18 CERVICAL MYOFASCIAL PAIN SYNDROME: ICD-10-CM

## 2021-02-16 DIAGNOSIS — G43.709 CHRONIC MIGRAINE WITHOUT AURA WITHOUT STATUS MIGRAINOSUS, NOT INTRACTABLE: Primary | ICD-10-CM

## 2021-02-16 PROCEDURE — 99212 OFFICE O/P EST SF 10 MIN: CPT | Performed by: OTHER

## 2021-02-16 PROCEDURE — 3008F BODY MASS INDEX DOCD: CPT | Performed by: OTHER

## 2021-02-16 PROCEDURE — 3078F DIAST BP <80 MM HG: CPT | Performed by: OTHER

## 2021-02-16 PROCEDURE — 3074F SYST BP LT 130 MM HG: CPT | Performed by: OTHER

## 2021-02-16 RX ORDER — DICLOFENAC SODIUM 75 MG/1
75 TABLET, DELAYED RELEASE ORAL 2 TIMES DAILY
COMMUNITY

## 2021-02-16 RX ORDER — LIOTHYRONINE SODIUM 25 UG/1
TABLET ORAL
COMMUNITY
End: 2021-12-09

## 2021-02-16 NOTE — PROGRESS NOTES
[address] [place] [address] with [facility]  2/16/2021    1:54 PM      Had Arthroscopic procedure on the left shoulder    Following Covid shot it triggered psoriatic arthritis flare up.       Gabapentin used and it made NEEDED., Disp: 50 mL, Rfl: 1  •  AIMOVIG 140 MG/ML Subcutaneous Solution Auto-injector, INJECT 1 ML UNDER THE SKIN EVERY 30 DAYS, Disp: 3 mL, Rfl: 3  •  Hydroxychloroquine Sulfate 200 MG Oral Tab, Take 200 mg by mouth daily.   , Disp: , Rfl:   •  Hawa Mendoza 2-12 OK  No motor and sensory focal deficit  Gait and coordination OK  Reflexes symmetric and no pathologic reflexes seen        IMPRESSION  Chronic migraine without aura without status migrainosus, not intractable  (primary encounter diagnosis)  Cervical

## 2021-03-01 ENCOUNTER — TELEPHONE (OUTPATIENT)
Dept: NEUROLOGY | Facility: CLINIC | Age: 51
End: 2021-03-01

## 2021-03-01 DIAGNOSIS — G43.709 CHRONIC MIGRAINE WITHOUT AURA WITHOUT STATUS MIGRAINOSUS, NOT INTRACTABLE: Primary | ICD-10-CM

## 2021-03-01 NOTE — TELEPHONE ENCOUNTER
Received incoming fax from [employer] stating that AIMOVIG 140 mg needs a PA. Started PA on CMM with key code [address]. Your request has been approved  [id number]; Review Type:Prior Auth; Coverage Start Date:01/30/2021; Coverage E

## 2021-03-29 PROBLEM — Z79.620 ADALIMUMAB (HUMIRA) LONG-TERM USE: Status: ACTIVE | Noted: 2021-03-29

## 2021-03-29 PROBLEM — Z79.899 ADALIMUMAB (HUMIRA) LONG-TERM USE: Status: ACTIVE | Noted: 2021-03-29

## 2021-04-25 DIAGNOSIS — G43.709 CHRONIC MIGRAINE WITHOUT AURA WITHOUT STATUS MIGRAINOSUS, NOT INTRACTABLE: ICD-10-CM

## 2021-04-26 RX ORDER — TOPIRAMATE 25 MG/1
TABLET ORAL
Qty: 270 TABLET | Refills: 3 | Status: SHIPPED | OUTPATIENT
Start: 2021-04-26

## 2021-04-26 NOTE — TELEPHONE ENCOUNTER
Medication: topiramate    Date of last refill: 10/19/20  [place]    Last office visit: 2/16/21  Due back to clinic per last office note:  6 months  Date next office visit scheduled:    Future Appointments   Date Time

## 2021-06-07 NOTE — TELEPHONE ENCOUNTER
Medication: Topiramate 25 mg & Frovatriotan 2.5mg     Date of last refill:04/23/18 with 3 addt refills # 270 & 01/28/19 with 1 addt refill # 36  [place]      Last office visit: 12/12/2018  Due back to clinic per las Pt called as he wanted more information about the results of his stress test.    Test was ordered by cardiology.    Pt transferred to cardiology.    Anette Hauser RN   [facility] Medication Refill and Triage Nurse  12:42 PM  4/2/2020    Reason for Disposition    Caller requesting lab results    Protocols used: PCP CALL - NO TRIAGE-A-                          Instructions          Return in about 6 months (around 6/12/2019).

## 2021-08-11 NOTE — ADDENDUM NOTE
Addended by: Yvon Leiva on: 1/6/2020 03:29 PM     Modules accepted: Orders August 11, 2021         Patient: Maximino Walters   YOB: 1991   Date of Visit: 8/11/2021           To Whom it May Concern:    Maximino Walters was seen in my clinic on 8/11/2021. He may return to work on 8/11, full duty.            If you have any questions or concerns, please don't hesitate to call.        Sincerely,           Terrence Mccarthy M.D.  Electronically Signed      Clear bilaterally, pupils equal, round and reactive to light.

## 2021-09-09 DIAGNOSIS — G43.709 CHRONIC MIGRAINE WITHOUT AURA WITHOUT STATUS MIGRAINOSUS, NOT INTRACTABLE: ICD-10-CM

## 2021-09-09 RX ORDER — ERENUMAB-AOOE 140 MG/ML
INJECTION, SOLUTION SUBCUTANEOUS
Qty: 3 ML | Refills: 5 | Status: SHIPPED | OUTPATIENT
Start: 2021-09-09 | End: 2021-09-15

## 2021-09-09 NOTE — TELEPHONE ENCOUNTER
Medication: Aimovig 140 mg     Date of last refill:09/25/20 with 3 addt refills  [place]     Last office visit: 2/16/21  Due back to clinic per last office note:  6 months  Date next office visit scheduled:         [place]

## 2021-09-12 DIAGNOSIS — G43.709 CHRONIC MIGRAINE WITHOUT AURA WITHOUT STATUS MIGRAINOSUS, NOT INTRACTABLE: ICD-10-CM

## 2021-09-12 RX ORDER — ERENUMAB-AOOE 140 MG/ML
140 INJECTION, SOLUTION SUBCUTANEOUS
Qty: 3 ML | Refills: 5 | Status: CANCELLED | OUTPATIENT
Start: 2021-09-12

## 2021-09-15 ENCOUNTER — TELEPHONE (OUTPATIENT)
Dept: NEUROLOGY | Facility: CLINIC | Age: 51
End: 2021-09-15

## 2021-09-15 DIAGNOSIS — G43.709 CHRONIC MIGRAINE WITHOUT AURA WITHOUT STATUS MIGRAINOSUS, NOT INTRACTABLE: ICD-10-CM

## 2021-09-15 RX ORDER — ERENUMAB-AOOE 140 MG/ML
140 INJECTION, SOLUTION SUBCUTANEOUS
Qty: 1 ML | Refills: 5 | Status: SHIPPED | OUTPATIENT
Start: 2021-09-15

## 2021-10-06 DIAGNOSIS — G43.709 CHRONIC MIGRAINE WITHOUT AURA WITHOUT STATUS MIGRAINOSUS, NOT INTRACTABLE: ICD-10-CM

## 2021-10-06 RX ORDER — FROVATRIPTAN SUCCINATE 2.5 MG/1
TABLET, FILM COATED ORAL
Qty: 36 TABLET | Refills: 1 | Status: SHIPPED | OUTPATIENT
Start: 2021-10-06

## 2021-10-06 NOTE — TELEPHONE ENCOUNTER
Medication: Frovatriptan 2.5 mg     Date of last refill: 12/28/20 with 1 addt refll  [place]     Last office visit: 2/16/21  Due back to clinic per last office note:  6 months  Date next office visit scheduled:

## 2022-02-04 ENCOUNTER — TELEPHONE (OUTPATIENT)
Dept: NEUROLOGY | Facility: CLINIC | Age: 52
End: 2022-02-04

## 2022-05-02 RX ORDER — TOPIRAMATE 25 MG/1
TABLET ORAL
Qty: 270 TABLET | Refills: 0 | Status: SHIPPED | OUTPATIENT
Start: 2022-05-02

## 2022-05-02 RX ORDER — TOPIRAMATE 25 MG/1
TABLET ORAL
Qty: 90 TABLET | Refills: 0 | Status: SHIPPED | OUTPATIENT
Start: 2022-05-02 | End: 2022-05-02

## 2022-06-14 ENCOUNTER — OFFICE VISIT (OUTPATIENT)
Dept: NEUROLOGY | Facility: CLINIC | Age: 52
End: 2022-06-14
Payer: COMMERCIAL

## 2022-06-14 VITALS
WEIGHT: 146 LBS | SYSTOLIC BLOOD PRESSURE: 124 MMHG | HEIGHT: 66 IN | RESPIRATION RATE: 16 BRPM | HEART RATE: 88 BPM | BODY MASS INDEX: 23.46 KG/M2 | DIASTOLIC BLOOD PRESSURE: 75 MMHG

## 2022-06-14 DIAGNOSIS — G43.709 CHRONIC MIGRAINE WITHOUT AURA WITHOUT STATUS MIGRAINOSUS, NOT INTRACTABLE: ICD-10-CM

## 2022-06-14 DIAGNOSIS — G24.3 ISOLATED CERVICAL DYSTONIA: Primary | ICD-10-CM

## 2022-06-14 PROCEDURE — 3074F SYST BP LT 130 MM HG: CPT | Performed by: OTHER

## 2022-06-14 PROCEDURE — 99214 OFFICE O/P EST MOD 30 MIN: CPT | Performed by: OTHER

## 2022-06-14 PROCEDURE — 3078F DIAST BP <80 MM HG: CPT | Performed by: OTHER

## 2022-06-14 PROCEDURE — 3008F BODY MASS INDEX DOCD: CPT | Performed by: OTHER

## 2022-06-14 RX ORDER — ATOGEPANT 30 MG/1
30 TABLET ORAL DAILY
Qty: 30 TABLET | Refills: 5 | Status: SHIPPED | OUTPATIENT
Start: 2022-06-14 | End: 2022-06-14 | Stop reason: ALTCHOICE

## 2022-06-14 RX ORDER — ERENUMAB-AOOE 140 MG/ML
140 INJECTION, SOLUTION SUBCUTANEOUS
Qty: 1 ML | Refills: 11 | Status: SHIPPED | OUTPATIENT
Start: 2022-06-14 | End: 2022-06-14

## 2022-06-14 RX ORDER — TOPIRAMATE 25 MG/1
25 TABLET ORAL 3 TIMES DAILY
Qty: 270 TABLET | Refills: 3 | Status: SHIPPED | OUTPATIENT
Start: 2022-06-14

## 2022-06-14 RX ORDER — FROVATRIPTAN SUCCINATE 2.5 MG/1
TABLET, FILM COATED ORAL
Qty: 36 TABLET | Refills: 3 | Status: SHIPPED | OUTPATIENT
Start: 2022-06-14

## 2022-06-14 RX ORDER — RISANKIZUMAB-RZAA 150 MG/ML
150 INJECTION SUBCUTANEOUS
COMMUNITY
Start: 2022-04-20

## 2022-06-16 ENCOUNTER — TELEPHONE (OUTPATIENT)
Dept: SURGERY | Facility: CLINIC | Age: 52
End: 2022-06-16

## 2022-06-16 DIAGNOSIS — M79.18 MYOFASCIAL MUSCLE PAIN: Primary | ICD-10-CM

## 2022-06-16 DIAGNOSIS — G24.3 CERVICAL DYSTONIA: ICD-10-CM

## 2022-06-16 NOTE — TELEPHONE ENCOUNTER
Please place a referral for Botox    Zan Mac MD sent to [facility]  Botox approval for Cervical dystonia   See notes for specific dosing

## 2022-06-16 NOTE — TELEPHONE ENCOUNTER
Per LOV note: For the BOTOX injection, 10-15  Units each muscle will be required for a minimum of 60 units to max 90 units on the following:  Bilateral semispinalis, splenius Capitis and Scalenus. Referral order for Botox placed.

## 2022-06-17 NOTE — TELEPHONE ENCOUNTER
Called [facility] per ny B patient is active. He said that buy and bill is ok for Botox in the Office. He directed me to call the PA department [phone]. Call reference #[id number]. I called [phone] and spoke with Wan Groves. For Botox  and 17335 in office as buy and bill dX code G24.3. No PA or predetermination required. Call reference #[id number]      Once Botox 100 units is available call patient for visit. Please notify the PA team of the date.

## 2022-06-21 ENCOUNTER — TELEPHONE (OUTPATIENT)
Dept: NEUROLOGY | Facility: CLINIC | Age: 52
End: 2022-06-21

## 2022-06-28 ENCOUNTER — OFFICE VISIT (OUTPATIENT)
Dept: NEUROLOGY | Facility: CLINIC | Age: 52
End: 2022-06-28
Payer: COMMERCIAL

## 2022-06-28 VITALS
SYSTOLIC BLOOD PRESSURE: 126 MMHG | RESPIRATION RATE: 16 BRPM | WEIGHT: 146 LBS | BODY MASS INDEX: 23.46 KG/M2 | HEIGHT: 66 IN | HEART RATE: 78 BPM | DIASTOLIC BLOOD PRESSURE: 66 MMHG

## 2022-06-28 DIAGNOSIS — M79.18 CERVICAL MYOFASCIAL PAIN SYNDROME: ICD-10-CM

## 2022-06-28 DIAGNOSIS — G43.709 CHRONIC MIGRAINE WITHOUT AURA WITHOUT STATUS MIGRAINOSUS, NOT INTRACTABLE: Primary | ICD-10-CM

## 2022-06-28 PROCEDURE — 3008F BODY MASS INDEX DOCD: CPT | Performed by: OTHER

## 2022-06-28 PROCEDURE — 64615 CHEMODENERV MUSC MIGRAINE: CPT | Performed by: OTHER

## 2022-06-28 PROCEDURE — 3074F SYST BP LT 130 MM HG: CPT | Performed by: OTHER

## 2022-06-28 PROCEDURE — 3078F DIAST BP <80 MM HG: CPT | Performed by: OTHER

## 2022-06-28 NOTE — PROGRESS NOTES
Using alcohol prep the following were injected    10 units right and left temporalis, 10 units right and left anterior auricular. 15 units occipitalis each side for a total of 30 units. 10 units levator scapula bilateral for a total of 20 units  10 units upper trapezius bilateral for total of 20 units. 5 units each side of the semispinalis for total of 10 units  Extra units left over even through the insertion site of the left sternocleidomastoid retroauricular.     Total used: 028 unit    No complications    Corrina Bolaños MD  Vascular & General Neurology  Director, Multiple Sclerosis Program  [facility]  6/28/2022, Time completed 3:15 PM

## 2022-06-28 NOTE — PROGRESS NOTES
Paperwork noting that patient may bear financial responsibility for procedure(s) performed in clinic today signed prior to proceeding with procedure(s). Furthermore, patient notified that they should contact their insurer to verify that your procedure/test has been approved and is a COVERED benefit. Although the [facility] staff does its due diligence, the insurance carrier gives the disclaimer that \"Although the procedure is authorized, this does not guarantee payment. \"    Ultimately the patient is responsible for payment.     Botox is:  [x] Buy and Bill  [] Patient Supplied

## 2022-10-04 ENCOUNTER — OFFICE VISIT (OUTPATIENT)
Dept: NEUROLOGY | Facility: CLINIC | Age: 52
End: 2022-10-04
Payer: COMMERCIAL

## 2022-10-04 VITALS
HEART RATE: 85 BPM | DIASTOLIC BLOOD PRESSURE: 76 MMHG | HEIGHT: 66 IN | WEIGHT: 146 LBS | RESPIRATION RATE: 16 BRPM | SYSTOLIC BLOOD PRESSURE: 117 MMHG | BODY MASS INDEX: 23.46 KG/M2

## 2022-10-04 DIAGNOSIS — M79.18 CERVICAL MYOFASCIAL PAIN SYNDROME: Primary | ICD-10-CM

## 2022-10-04 DIAGNOSIS — G43.709 CHRONIC MIGRAINE WITHOUT AURA WITHOUT STATUS MIGRAINOSUS, NOT INTRACTABLE: ICD-10-CM

## 2022-10-04 PROCEDURE — 3078F DIAST BP <80 MM HG: CPT | Performed by: OTHER

## 2022-10-04 PROCEDURE — 3074F SYST BP LT 130 MM HG: CPT | Performed by: OTHER

## 2022-10-04 PROCEDURE — 64616 CHEMODENERV MUSC NECK DYSTON: CPT | Performed by: OTHER

## 2022-10-04 PROCEDURE — 3008F BODY MASS INDEX DOCD: CPT | Performed by: OTHER

## 2022-10-04 RX ORDER — ATOGEPANT 30 MG/1
30 TABLET ORAL DAILY
Qty: 30 TABLET | Refills: 5 | Status: SHIPPED | OUTPATIENT
Start: 2022-10-04

## 2022-10-04 RX ORDER — TOPIRAMATE 25 MG/1
25 TABLET ORAL 3 TIMES DAILY
Qty: 270 TABLET | Refills: 3 | Status: SHIPPED | OUTPATIENT
Start: 2022-10-04

## 2022-10-04 RX ORDER — FROVATRIPTAN SUCCINATE 2.5 MG/1
TABLET, FILM COATED ORAL
Qty: 36 TABLET | Refills: 3 | Status: SHIPPED | OUTPATIENT
Start: 2022-10-04

## 2022-10-04 NOTE — PROGRESS NOTES
[address]  10/4/2022    4:06 PM      Dx CErvical dystonia    Bilateral sternocleidomastoid retroauricular: 5 units each side  [10]  Bilateral semispinalis capitis 10 units each side   [20]  Bilateral splenius capitis 10 units each side   [20]   Levator scapula 10 units each side   [20]  Upper trapezius 10 units each side   [20]  Lower cervical paraspinal 5 units each side   [10]    Total use 100 units, none discarded  Tolerated procedure well    Keny Walton MD  Vascular & General Neurology  Director, Multiple Sclerosis Program  [facility]  10/4/2022, Time completed 4:08 PM

## 2022-11-22 DIAGNOSIS — G43.709 CHRONIC MIGRAINE WITHOUT AURA WITHOUT STATUS MIGRAINOSUS, NOT INTRACTABLE: ICD-10-CM

## 2022-11-22 RX ORDER — ATOGEPANT 30 MG/1
TABLET ORAL
Qty: 30 TABLET | Refills: 5 | Status: SHIPPED | OUTPATIENT
Start: 2022-11-22

## 2022-11-22 NOTE — TELEPHONE ENCOUNTER
Medication:Qulipta 30 mg     Date of last refill: 10/04/2022 with 5 addt refills  [place]      Last office visit: 10/04/2022  Due back to clinic per last office note:    Date next office visit scheduled:    Future Appointments   Date Time Provider Carl Parisi   1/10/2023  2:20 PM MD ASHWINI Stewart           Last OV note recommendation:    Botox

## 2022-12-06 ENCOUNTER — TELEPHONE (OUTPATIENT)
Dept: NEUROLOGY | Facility: CLINIC | Age: 52
End: 2022-12-06

## 2022-12-06 NOTE — TELEPHONE ENCOUNTER
As of 81/72/1346 - all [facility] policies require PA for .     [facility] @ [phone], spoke with May    CPT 54404 - NO PA required   - PA required through [address] @ [address] @ [phone], Spoke with Rock Lind for [facility]. AT [facility] for  - Pending case # [id number]    Faxed clinicals to [phone]

## 2022-12-19 ENCOUNTER — TELEPHONE (OUTPATIENT)
Dept: NEUROLOGY | Facility: CLINIC | Age: 52
End: 2022-12-19

## 2023-01-05 ENCOUNTER — TELEPHONE (OUTPATIENT)
Dept: NEUROLOGY | Facility: CLINIC | Age: 53
End: 2023-01-05

## 2023-06-01 ENCOUNTER — TELEPHONE (OUTPATIENT)
Dept: NEUROLOGY | Facility: CLINIC | Age: 53
End: 2023-06-01

## 2023-06-01 NOTE — TELEPHONE ENCOUNTER
Pt stated she is having increased migraines and has questions about taking both of medications together. Call pt to discuss and advise.

## 2023-06-01 NOTE — TELEPHONE ENCOUNTER
Discussed option  Rinvoq seems to be helping the dermatitis,  Then what makes sense is replace Qlipta with AIMOVIG just because its mechanism of action is it is a receptor blocker   She has some left from prior and will start it.     She will see me in 2-3 weeks    Dr. Karla Barrientos

## 2023-06-01 NOTE — TELEPHONE ENCOUNTER
Patient called and advised she is experiencing increased migraines in the last 2 weeks. Per patient she started Rinvoq 2 weeks ago. Patient is wondering if she should be on [place] and Rinvoq. She wants to know if it lowers the effectiveness of the medications. Per patient she has take 8 Frovatriptans in the last 2 weeks. RN explained she could be experiencing rebound migraines. Patient denies that is what is causing the migraine and wants to know about the interactions of the two medications. Please advise.

## 2023-06-20 ENCOUNTER — OFFICE VISIT (OUTPATIENT)
Dept: NEUROLOGY | Facility: CLINIC | Age: 53
End: 2023-06-20
Payer: COMMERCIAL

## 2023-06-20 VITALS
DIASTOLIC BLOOD PRESSURE: 62 MMHG | WEIGHT: 146 LBS | SYSTOLIC BLOOD PRESSURE: 114 MMHG | HEIGHT: 66 IN | RESPIRATION RATE: 16 BRPM | HEART RATE: 63 BPM | BODY MASS INDEX: 23.46 KG/M2

## 2023-06-20 DIAGNOSIS — G43.709 CHRONIC MIGRAINE WITHOUT AURA WITHOUT STATUS MIGRAINOSUS, NOT INTRACTABLE: ICD-10-CM

## 2023-06-20 DIAGNOSIS — M79.18 CERVICAL MYOFASCIAL PAIN SYNDROME: Primary | ICD-10-CM

## 2023-06-20 PROCEDURE — 3078F DIAST BP <80 MM HG: CPT | Performed by: OTHER

## 2023-06-20 PROCEDURE — 99213 OFFICE O/P EST LOW 20 MIN: CPT | Performed by: OTHER

## 2023-06-20 PROCEDURE — 3074F SYST BP LT 130 MM HG: CPT | Performed by: OTHER

## 2023-06-20 PROCEDURE — 3008F BODY MASS INDEX DOCD: CPT | Performed by: OTHER

## 2023-06-20 RX ORDER — ERENUMAB-AOOE 140 MG/ML
140 INJECTION, SOLUTION SUBCUTANEOUS ONCE
COMMUNITY

## 2023-07-12 DIAGNOSIS — G43.009 MIGRAINE WITHOUT AURA AND WITHOUT STATUS MIGRAINOSUS, NOT INTRACTABLE: ICD-10-CM

## 2023-07-12 DIAGNOSIS — G43.709 CHRONIC MIGRAINE WITHOUT AURA WITHOUT STATUS MIGRAINOSUS, NOT INTRACTABLE: Primary | ICD-10-CM

## 2023-07-12 NOTE — TELEPHONE ENCOUNTER
Nurtec samples worked well for patient, patient has been using them as a rescue med approximately 1x/week, and would like to have an rx written. Please advise and send to:      [address], [address] [facility],[address] [facility], [phone], [phone].

## 2023-07-13 RX ORDER — RIMEGEPANT SULFATE 75 MG/75MG
75 TABLET, ORALLY DISINTEGRATING ORAL AS NEEDED
Qty: 8 TABLET | Refills: 0 | Status: SHIPPED | OUTPATIENT
Start: 2023-07-13 | End: 2024-07-12

## 2023-07-13 NOTE — TELEPHONE ENCOUNTER
[facility]     Medication pended for provider approval.

## 2023-07-18 ENCOUNTER — TELEPHONE (OUTPATIENT)
Dept: NEUROLOGY | Facility: CLINIC | Age: 53
End: 2023-07-18

## 2023-07-18 RX ORDER — ERENUMAB-AOOE 140 MG/ML
140 INJECTION, SOLUTION SUBCUTANEOUS
Qty: 1 ML | Refills: 5 | Status: SHIPPED | OUTPATIENT
Start: 2023-07-18 | End: 2024-07-17

## 2023-07-18 NOTE — TELEPHONE ENCOUNTER
Pt stated she needs a pa for aimovig. Call pt when pa completed. Pt stated she does not use mychart.

## 2023-07-18 NOTE — TELEPHONE ENCOUNTER
Per pt, states the during last OV, it was discussed to switch from [facility] back to [address] [place] [address]  Pt is needing refill as she is upcoming due for injection    Pending Rx, please sign.     PA requested for Aimovig  Clinical questions answered and submitted to insurance  Awaiting coverage determination

## 2023-07-24 NOTE — TELEPHONE ENCOUNTER
Received fax from [address] received for patient use of Aimovig   Approval granted: 6/17/23-7/20/24        Pt notified

## 2023-08-12 DIAGNOSIS — G43.009 MIGRAINE WITHOUT AURA AND WITHOUT STATUS MIGRAINOSUS, NOT INTRACTABLE: ICD-10-CM

## 2023-08-14 RX ORDER — RIMEGEPANT SULFATE 75 MG/75MG
1 TABLET, ORALLY DISINTEGRATING ORAL AS NEEDED
Qty: 8 TABLET | Refills: 11 | Status: SHIPPED | OUTPATIENT
Start: 2023-08-14

## 2023-10-07 DIAGNOSIS — G43.709 CHRONIC MIGRAINE WITHOUT AURA WITHOUT STATUS MIGRAINOSUS, NOT INTRACTABLE: ICD-10-CM

## 2023-10-09 RX ORDER — TOPIRAMATE 25 MG/1
25 TABLET ORAL 3 TIMES DAILY
Qty: 270 TABLET | Refills: 3 | Status: SHIPPED | OUTPATIENT
Start: 2023-10-09

## 2023-10-09 NOTE — TELEPHONE ENCOUNTER
Medication: topiramate 25 MG      Date of last refill: 10/4/22 (#270/3R)  [place]     Last office visit: 6/20/23  Due back to clinic per last office note:  6 mon  Date next office visit scheduled:    Future Appointments   Date Time Provider Carl Parisi   12/19/2023  2:00 PM Rapheal Councilman, MD [facility]           Last OV note recommendation:    Problem/s Identified this visit:   Cervical myofascial pain syndrome  (primary encounter diagnosis)  Chronic migraine without aura without status migrainosus, not intractable        Discussion plus Diagnostics & Treatment Orders:  Samples of Nurtec give to try  Although of course for the most part [facility] works  Will keep on all same preventative  Also selective Botox to neck has not worked in the past and could have caused more pain

## 2023-10-15 NOTE — TELEPHONE ENCOUNTER
[employer] sent letter requesting supporting documents for Aimovig.
Letter from [employer] requesting supporting documentation did not provide a fax number to send records. Spoke to Cando at [facility]. Per account notes, Angélica Gardner was sent to the patient on 7/13/18.   It was processed through patient's medical coverage, not phar
Received request for Aimovig to be filled at [facility]. Script forwarded as requested.
Attending Attestation (For Attendings USE Only)...

## 2023-12-19 ENCOUNTER — OFFICE VISIT (OUTPATIENT)
Dept: NEUROLOGY | Facility: CLINIC | Age: 53
End: 2023-12-19
Payer: COMMERCIAL

## 2023-12-19 VITALS
HEART RATE: 70 BPM | SYSTOLIC BLOOD PRESSURE: 121 MMHG | BODY MASS INDEX: 23.46 KG/M2 | HEIGHT: 66 IN | WEIGHT: 146 LBS | DIASTOLIC BLOOD PRESSURE: 65 MMHG | RESPIRATION RATE: 16 BRPM

## 2023-12-19 DIAGNOSIS — G43.709 CHRONIC MIGRAINE WITHOUT AURA WITHOUT STATUS MIGRAINOSUS, NOT INTRACTABLE: ICD-10-CM

## 2023-12-19 DIAGNOSIS — M79.18 CERVICAL MYOFASCIAL PAIN SYNDROME: Primary | ICD-10-CM

## 2023-12-19 PROCEDURE — 3008F BODY MASS INDEX DOCD: CPT | Performed by: OTHER

## 2023-12-19 PROCEDURE — 3078F DIAST BP <80 MM HG: CPT | Performed by: OTHER

## 2023-12-19 PROCEDURE — 99214 OFFICE O/P EST MOD 30 MIN: CPT | Performed by: OTHER

## 2023-12-19 PROCEDURE — 3074F SYST BP LT 130 MM HG: CPT | Performed by: OTHER

## 2023-12-19 RX ORDER — TOPIRAMATE 25 MG/1
100 TABLET ORAL DAILY
Qty: 360 TABLET | Refills: 1 | Status: SHIPPED | OUTPATIENT
Start: 2023-12-19

## 2023-12-19 RX ORDER — TOPIRAMATE 25 MG/1
100 TABLET ORAL DAILY
COMMUNITY
End: 2023-12-19

## 2023-12-19 RX ORDER — ERENUMAB-AOOE 140 MG/ML
140 INJECTION, SOLUTION SUBCUTANEOUS
Qty: 1 ML | Refills: 11 | Status: SHIPPED | OUTPATIENT
Start: 2023-12-19 | End: 2024-12-18

## 2023-12-19 RX ORDER — FROVATRIPTAN SUCCINATE 2.5 MG/1
TABLET, FILM COATED ORAL
Qty: 36 TABLET | Refills: 3 | Status: SHIPPED | OUTPATIENT
Start: 2023-12-19

## 2023-12-19 RX ORDER — ROSUVASTATIN CALCIUM 10 MG/1
10 TABLET, COATED ORAL DAILY
COMMUNITY
Start: 2023-09-25 | End: 2024-09-24

## 2024-06-17 ENCOUNTER — OFFICE VISIT (OUTPATIENT)
Dept: NEUROLOGY | Facility: CLINIC | Age: 54
End: 2024-06-17
Payer: COMMERCIAL

## 2024-06-17 VITALS
RESPIRATION RATE: 18 BRPM | BODY MASS INDEX: 20.89 KG/M2 | SYSTOLIC BLOOD PRESSURE: 108 MMHG | DIASTOLIC BLOOD PRESSURE: 70 MMHG | WEIGHT: 130 LBS | OXYGEN SATURATION: 98 % | HEIGHT: 66 IN | HEART RATE: 75 BPM

## 2024-06-17 DIAGNOSIS — G43.709 CHRONIC MIGRAINE WITHOUT AURA WITHOUT STATUS MIGRAINOSUS, NOT INTRACTABLE: Primary | ICD-10-CM

## 2024-06-17 DIAGNOSIS — M79.18 CERVICAL MYOFASCIAL PAIN SYNDROME: ICD-10-CM

## 2024-06-17 PROCEDURE — 99214 OFFICE O/P EST MOD 30 MIN: CPT | Performed by: OTHER

## 2024-06-17 PROCEDURE — 3074F SYST BP LT 130 MM HG: CPT | Performed by: OTHER

## 2024-06-17 PROCEDURE — 3008F BODY MASS INDEX DOCD: CPT | Performed by: OTHER

## 2024-06-17 PROCEDURE — 3078F DIAST BP <80 MM HG: CPT | Performed by: OTHER

## 2024-06-17 RX ORDER — RIMEGEPANT SULFATE 75 MG/75MG
TABLET, ORALLY DISINTEGRATING ORAL
Qty: 16 TABLET | Refills: 3 | Status: SHIPPED | OUTPATIENT
Start: 2024-06-17

## 2024-06-17 RX ORDER — ESTRADIOL 10 UG/1
INSERT VAGINAL
COMMUNITY
Start: 2024-04-02

## 2024-06-17 NOTE — PATIENT INSTRUCTIONS
Refill policies:    Allow 2-3 business days for refills; controlled substances may take longer.  Contact your pharmacy at least 5 days prior to running out of medication and have them send an electronic request or submit request through the “request refill” option in your [employer] account.  Refills are not addressed on weekends; covering physicians do not authorize routine medications on weekends.  No narcotics or controlled substances are refilled after noon on Fridays or by on call physicians.  By law, narcotics must be electronically prescribed.  A 30 day supply with no refills is the maximum allowed.  If your prescription is due for a refill, you may be due for a follow up appointment.  To best provide you care, patients receiving routine medications need to be seen at least once a year.  Patients receiving narcotic/controlled substance medications need to be seen at least once every 3 months.  In the event that your preferred pharmacy does not have the requested medication in stock (e.g. Backordered), it is your responsibility to find another pharmacy that has the requested medication available.  We will gladly send a new prescription to that pharmacy at your request.    Scheduling Tests:    If your physician has ordered radiology tests such as MRI or CT scans, please contact Central Scheduling at [phone] right away to schedule the test.  Once scheduled, the [facility] hospital Centralized Referral Team will work with your insurance carrier to obtain pre-certification or prior authorization.  Depending on your insurance carrier, approval may take 3-10 days.  It is highly recommended patients assure they have received an authorization before having a test performed.  If test is done without insurance authorization, patient may be responsible for the entire amount billed.      Precertification and Prior Authorizations:  If your physician has recommended that you have a procedure or additional testing performed the Critical access hospital  Centralized Referral Team will contact your insurance carrier to obtain pre-certification or prior authorization.    You are strongly encouraged to contact your insurance carrier to verify that your procedure/test has been approved and is a COVERED benefit.  Although the [facility] Centralized Referral Team does its due diligence, the insurance carrier gives the disclaimer that \"Although the procedure is authorized, this does not guarantee payment.\"    Ultimately the patient is responsible for payment.   Thank you for your understanding in this matter.  Paperwork Completion:  If you require FMLA or disability paperwork for your recovery, please make sure to either drop it off or have it faxed to our office at [phone]. Be sure the form has your name and date of birth on it.  The form will be faxed to our Forms Department and they will complete it for you.  There is a 25$ fee for all forms that need to be filled out.  Please be aware there is a 10-14 day turnaround time.  You will need to sign a release of information (RAKEL) form if your paperwork does not come with one.  You may call the Forms Department with any questions at [phone].  Their fax number is [phone].

## 2024-06-17 NOTE — PROGRESS NOTES
NEUROLOGY  [facility]       Tresa Grier  1/22/1970  Primary Care Provider:  BRAYDON VALENTE    6/17/2024  54 year old yo,  was last seen on:: December 2023    Seen for/plans last visit:  migraines    Previous visit and existing record notes reviewed in preparation for the face to face visit.  Relevant labs and studies reviewed and will be noted in relevant areas of this record.  Accompanied visit:     (x) No.      Present condition:  Nurtec works and can work through it without side effects  Reyvow did not like effect  Still on Topiramate and taking it at a 100 mg helped.    Past History update/new problem(s): no new problem, still on Rinvoq for the Psoriatic athritis    Review of Systems:  Review of Systems:  Denies systemic symptoms     No CP or SOB.  No GI or  symptoms. Relevant Neuro as noted above.      Medications:      Current Outpatient Medications:     Estradiol 10 MCG Vaginal Tab, INSERT ONE TABLET IN THE VAGINA 3 TIMES A WEEK, Disp: , Rfl:     Rimegepant Sulfate (NURTEC) 75 MG Oral Tablet Dispersible, 1 tab every other day as preventative, Disp: 16 tablet, Rfl: 3    rosuvastatin 10 MG Oral Tab, Take 1 tablet (10 mg total) by mouth daily., Disp: , Rfl:     topiramate 25 MG Oral Tab, Take 4 tablets (100 mg total) by mouth daily., Disp: 360 tablet, Rfl: 1    Frovatriptan Succinate 2.5 MG Oral Tab, TAKE 1 TABLET BY MOUTH AT ONSET OF MIGRAINE AND MAY REPEAT DOSE IN 4 HOURS AS NEEDED. MAXIMUM OF 2 TABLETS IN A 24 HOUR PERIOD., Disp: 36 tablet, Rfl: 3    Rimegepant Sulfate (NURTEC) 75 MG Oral Tablet Dispersible, Place 1 tablet onto the tongue as needed. MAX 75MG/DAY, Disp: 8 tablet, Rfl: 11    Upadacitinib ER 15 MG Oral Tablet 24 Hr, Take 15 mg by mouth daily., Disp: , Rfl:     Clobetasol Propionate 0.05 % External Solution, APPLY TO ITCHING AREA ON SCALP TWICE DAILY AS NEEDED, Disp: 50 mL, Rfl: 1    LEVOTHYROXINE 100 MCG Oral Tab, TAKE 1 TABLET BY MOUTH EVERY DAY AS DIRECTED,  Disp: 90 tablet, Rfl: 0    Estradiol (DIVIGEL) 0.75 MG/0.75GM Transdermal Gel, Place 1 Application onto the skin daily., Disp: 90 each, Rfl: 4    SPIRONOLACTONE 50 MG Oral Tab, TAKE 1 TABLET BY MOUTH EVERY DAY, Disp: 90 tablet, Rfl: 3    LIOTHYRONINE 25 MCG Oral Tab, TAKE ONE-HALF TABLET BY MOUTH DAILY, Disp: 90 tablet, Rfl: 0    KETOCONAZOLE 2 % External Shampoo, APPLY 1 ML EXTERNALLY 2 TIMES A WEEK, Disp: 120 mL, Rfl: 3    Diclofenac Sodium 75 MG Oral Tab EC, Take 1 tablet (75 mg total) by mouth 2 (two) times daily., Disp: , Rfl:     valACYclovir HCl 1 G Oral Tab, Take 1 tablet (1,000 mg total) by mouth daily., Disp: 90 tablet, Rfl: 3    HYDROcodone-acetaminophen (NORCO) 5-325 MG Oral Tab, Take 1-2 tablets every 6-8 hours as needed for pain., Disp: 30 tablet, Rfl: 0    PROGESTERONE MICRONIZED 100 MG Oral Cap, TAKE 1 CAPSULE(100 MG) BY MOUTH DAILY, Disp: 90 capsule, Rfl: 4    aspirin 81 MG Oral Tab, Take 1 tablet (81 mg total) by mouth daily., Disp: , Rfl:     Bimatoprost (LATISSE EX), Apply topically., Disp: , Rfl:     Diclofenac Sodium (VOLTAREN) 1 % Transdermal Gel, Apply 2 g topically 4 (four) times daily., Disp: 1 Tube, Rfl: 5    Levocetirizine Dihydrochloride 5 MG Oral Tab, Take 1 tablet (5 mg total) by mouth every evening., Disp: , Rfl:     Omega-3-acid Ethyl Esters (LOVAZA) 1 G Oral Cap, Take 1 capsule (1 g total) by mouth daily., Disp: , Rfl:     Cholecalciferol (VITAMIN D) 1000 UNITS Oral Tab, Take 1 tablet by mouth 3 (three) times daily.  , Disp: , Rfl:     Multiple Vitamins-Minerals (MULTIVITAMIN & MINERAL OR), Take  by mouth., Disp: , Rfl:     Coenzyme Q10 50 MG Oral Cap, Take  by mouth., Disp: , Rfl:     famotidine 20 MG Oral Tab, Take 1 tablet (20 mg total) by mouth 2 (two) times daily., Disp: , Rfl:   PRN:     Allergies:  Allergies   Allergen Reactions    Dog Dander [Dander]     Latex     Trees, Person     Sulfa Antibiotics           EXAM:  /70   Pulse 75   Resp 18   Ht 66\"   Wt 130 lb  (59 kg)   LMP 2014   SpO2 98%   BMI 20.98 kg/m²   Looks stated age  General Exam:  HENT:  pink conjunctiva anicteric sclerae  Neck no adenopathy, thyroid normal  Heart and Lungs:  normal  Extremities: no cyanosis, skin changes    NEURO  NEURO  Able to relate events with fluent speech and intact comprehension  CN 2-12: pupils reactive, VF full face symmetric sensation and movement tongue midline  No motor focal findings  Sensory: no lateralizing findings  Reflexes are symmetric  UMN signs: none  Gait: narrow based    MTPs in the upper traps and neck paraspinals      INTERPRETATION of RELEVANT LABS and other DATA:          Problem/s Identified this visit:   1. Chronic migraine without aura without status migrainosus, not intractable    2. Cervical myofascial pain syndrome          Discussion plus Diagnostics & Treatment Orders:  Switch to Nurtec EOD as preventative  Orders Placed This Encounter    Estradiol 10 MCG Vaginal Tab     Sig: INSERT ONE TABLET IN THE VAGINA 3 TIMES A WEEK    Rimegepant Sulfate (NURTEC) 75 MG Oral Tablet Dispersible     Si tab every other day as preventative     Dispense:  16 tablet     Refill:  3           (Continue ) Discussed potential side effects of any treatment relevant to above.  Includes explanation of tests as necessary.    Return in about 6 months (around 2024).      Patient understands that if needed, based on condition and or test results, follow up will be readjusted      Jordan Diaz MD  Vascular & General Neurology  Director, Multiple Sclerosis Program  [facility]  2024, Time completed 2:37 PM    Decision making:  ( x ) labs reviewed/ordered - 1  (  ) new diagnosis: - 1  ( x) Images & studies independently reviewed -non F2F  (  ) Case/studies discussed with other caregivers - -non F2F  (  ) Telephone time with patiern or authorized [facility] member--non F2F  ( x ) other records reviewed --non F2F including consultations  (  ) Fam  meetings - patient not present --non F2F  (  ) Independent Historian obtained    Non Face to Face CPT code 36039/92437 applies as documented above    PROCEDURE DONE     (   ) see notes        After visit, patient was escorted out and handed-off discharge process and instructions to the check out desk.  No additional issues relevant to visit were raised to staff at this time interval.        This document is to be interpreted as my current opinion regarding the case as of the stated date of service based on the information available to me at this time and may supersedes any prior opinion expressed either orally or in writing.  Services rendered are only within the scope of direct medical care  Sometimes, reports may have been prepared partially using a speech recognition software technology.  If a word or phrase is confusing or out of context, please do not hesitate to call for clarification.

## 2024-07-23 ENCOUNTER — TELEPHONE (OUTPATIENT)
Dept: NEUROLOGY | Facility: CLINIC | Age: 54
End: 2024-07-23

## 2024-07-23 NOTE — TELEPHONE ENCOUNTER
Received fax from [employer]   Approval received for patient use of Nurtec   Approval granted: 6/23/24-6/23/25        Pt notified

## 2024-07-23 NOTE — TELEPHONE ENCOUNTER
PA requested for [facility]  Clinical questions answered and submitted to insurance  Awaiting coverage determination

## 2024-09-28 DIAGNOSIS — G43.709 CHRONIC MIGRAINE WITHOUT AURA WITHOUT STATUS MIGRAINOSUS, NOT INTRACTABLE: ICD-10-CM

## 2024-09-30 RX ORDER — TOPIRAMATE 25 MG/1
100 TABLET, FILM COATED ORAL DAILY
Qty: 360 TABLET | Refills: 3 | Status: SHIPPED | OUTPATIENT
Start: 2024-09-30

## 2024-09-30 NOTE — TELEPHONE ENCOUNTER
Medication: Topiramate 100 mg     Date of last refill: 2023 with 1 addt refill  [place]      Last office visit: 2024  Due back to clinic per last office note:    Date next office visit scheduled:    Future Appointments   Date Time Provider Department Center   12/10/2024  2:40 PM Jordan Diaz MD [facility]           Last OV note recommendation:    Discussion plus Diagnostics & Treatment Orders:  Switch to Nurtec EOD as preventative       Orders Placed This Encounter    Estradiol 10 MCG Vaginal Tab       Sig: INSERT ONE TABLET IN THE VAGINA 3 TIMES A WEEK    Rimegepant Sulfate (NURTEC) 75 MG Oral Tablet Dispersible       Si tab every other day as preventative       Dispense:  16 tablet       Refill:  3               (Continue ) Discussed potential side effects of any treatment relevant to above.  Includes explanation of tests as necessary.     Return in about 6 months (around 2024).

## 2024-10-24 RX ORDER — RIMEGEPANT SULFATE 75 MG/75MG
TABLET, ORALLY DISINTEGRATING ORAL
Qty: 16 TABLET | Refills: 11 | Status: SHIPPED | OUTPATIENT
Start: 2024-10-24

## 2024-10-24 NOTE — TELEPHONE ENCOUNTER
Medication: NURTEC 75 MG      Date of last refill: 24 (#16/3R)  [place]): [place]     Last office visit: 24  Due back to clinic per last office note:  6 mon  Date next office visit scheduled:    Future Appointments   Date Time Provider Department Center   12/3/2024  1:20 PM Jordan Diaz MD [facility]           Last OV note recommendation:    Problem/s Identified this visit:   1. Chronic migraine without aura without status migrainosus, not intractable    2. Cervical myofascial pain syndrome             Discussion plus Diagnostics & Treatment Orders:  Switch to Nurtec EOD as preventative       Orders Placed This Encounter    Estradiol 10 MCG Vaginal Tab       Sig: INSERT ONE TABLET IN THE VAGINA 3 TIMES A WEEK    Rimegepant Sulfate (NURTEC) 75 MG Oral Tablet Dispersible       Si tab every other day as preventative       Dispense:  16 tablet       Refill:  3

## 2024-12-03 ENCOUNTER — OFFICE VISIT (OUTPATIENT)
Dept: NEUROLOGY | Facility: CLINIC | Age: 54
End: 2024-12-03
Payer: COMMERCIAL

## 2024-12-03 VITALS
HEART RATE: 84 BPM | WEIGHT: 130 LBS | BODY MASS INDEX: 20.89 KG/M2 | SYSTOLIC BLOOD PRESSURE: 110 MMHG | HEIGHT: 66 IN | DIASTOLIC BLOOD PRESSURE: 74 MMHG | RESPIRATION RATE: 16 BRPM

## 2024-12-03 DIAGNOSIS — M79.18 CERVICAL MYOFASCIAL PAIN SYNDROME: Primary | ICD-10-CM

## 2024-12-03 DIAGNOSIS — G43.709 CHRONIC MIGRAINE WITHOUT AURA WITHOUT STATUS MIGRAINOSUS, NOT INTRACTABLE: ICD-10-CM

## 2024-12-03 PROCEDURE — 99213 OFFICE O/P EST LOW 20 MIN: CPT | Performed by: OTHER

## 2024-12-03 PROCEDURE — 3008F BODY MASS INDEX DOCD: CPT | Performed by: OTHER

## 2024-12-03 PROCEDURE — 3074F SYST BP LT 130 MM HG: CPT | Performed by: OTHER

## 2024-12-03 PROCEDURE — 3078F DIAST BP <80 MM HG: CPT | Performed by: OTHER

## 2024-12-03 RX ORDER — CELECOXIB 200 MG/1
1 CAPSULE ORAL 2 TIMES DAILY PRN
COMMUNITY
Start: 2024-11-18

## 2024-12-03 RX ORDER — TOFACITINIB 11 MG/1
11 TABLET, FILM COATED, EXTENDED RELEASE ORAL DAILY
COMMUNITY
Start: 2024-11-19

## 2024-12-03 RX ORDER — ROSUVASTATIN CALCIUM 10 MG/1
10 TABLET, COATED ORAL DAILY
COMMUNITY
Start: 2024-09-19 | End: 2025-09-19

## 2024-12-03 RX ORDER — LEFLUNOMIDE 20 MG/1
20 TABLET ORAL DAILY
COMMUNITY

## 2024-12-03 NOTE — PROGRESS NOTES
NEUROLOGY  [facility]       Tresa Grier  1/22/1970  Primary Care Provider:  BRAYDON VALENTE    12/3/2024  54 year old yo,  was last seen on:: June 2024    Seen for/plans last visit:  Migraines     Previous visit and existing record notes reviewed in preparation for the face to face visit.  Relevant labs and studies reviewed and will be noted in relevant areas of this record.  Accompanied visit:     (x) No.      Present condition:  Since her last visit in June when we instituted Nurtec at then every other day preventative dose, her migraines have not been as bad and are manageable and responsive to Frova when she needs it.  Neck pain is not as severe.    There are no changes in character of the headaches and no focal neurologic symptoms reported.    Past History update/new problem(s): No new problems    Review of Systems:  Review of Systems:  Denies systemic symptoms.     No CP or SOB.  No GI or  symptoms. Relevant Neuro as noted above.      Medications:      Current Outpatient Medications:     celecoxib 200 MG Oral Cap, Take 1 capsule (200 mg total) by mouth 2 (two) times daily as needed., Disp: , Rfl:     rosuvastatin 10 MG Oral Tab, Take 1 tablet (10 mg total) by mouth daily., Disp: , Rfl:     Rimegepant Sulfate (NURTEC) 75 MG Oral Tablet Dispersible, DISSOLVE 1 TABLET BY MOUTH EVERY OTHER DAY AS PREVENTATIVE., Disp: 16 tablet, Rfl: 11    TOPIRAMATE 25 MG Oral Tab, TAKE 4 TABLETS(100 MG) BY MOUTH DAILY, Disp: 360 tablet, Rfl: 3    Estradiol 10 MCG Vaginal Tab, INSERT ONE TABLET IN THE VAGINA 3 TIMES A WEEK, Disp: , Rfl:     Frovatriptan Succinate 2.5 MG Oral Tab, TAKE 1 TABLET BY MOUTH AT ONSET OF MIGRAINE AND MAY REPEAT DOSE IN 4 HOURS AS NEEDED. MAXIMUM OF 2 TABLETS IN A 24 HOUR PERIOD., Disp: 36 tablet, Rfl: 3    Rimegepant Sulfate (NURTEC) 75 MG Oral Tablet Dispersible, Place 1 tablet onto the tongue as needed. MAX 75MG/DAY, Disp: 8 tablet, Rfl: 11    Upadacitinib ER 15 MG  Oral Tablet 24 Hr, Take 15 mg by mouth daily., Disp: , Rfl:     Clobetasol Propionate 0.05 % External Solution, APPLY TO ITCHING AREA ON SCALP TWICE DAILY AS NEEDED, Disp: 50 mL, Rfl: 1    LEVOTHYROXINE 100 MCG Oral Tab, TAKE 1 TABLET BY MOUTH EVERY DAY AS DIRECTED, Disp: 90 tablet, Rfl: 0    Estradiol (DIVIGEL) 0.75 MG/0.75GM Transdermal Gel, Place 1 Application onto the skin daily., Disp: 90 each, Rfl: 4    SPIRONOLACTONE 50 MG Oral Tab, TAKE 1 TABLET BY MOUTH EVERY DAY, Disp: 90 tablet, Rfl: 3    LIOTHYRONINE 25 MCG Oral Tab, TAKE ONE-HALF TABLET BY MOUTH DAILY, Disp: 90 tablet, Rfl: 0    KETOCONAZOLE 2 % External Shampoo, APPLY 1 ML EXTERNALLY 2 TIMES A WEEK, Disp: 120 mL, Rfl: 3    valACYclovir HCl 1 G Oral Tab, Take 1 tablet (1,000 mg total) by mouth daily., Disp: 90 tablet, Rfl: 3    HYDROcodone-acetaminophen (NORCO) 5-325 MG Oral Tab, Take 1-2 tablets every 6-8 hours as needed for pain., Disp: 30 tablet, Rfl: 0    PROGESTERONE MICRONIZED 100 MG Oral Cap, TAKE 1 CAPSULE(100 MG) BY MOUTH DAILY, Disp: 90 capsule, Rfl: 4    aspirin 81 MG Oral Tab, Take 1 tablet (81 mg total) by mouth daily., Disp: , Rfl:     Bimatoprost (LATISSE EX), Apply topically., Disp: , Rfl:     Diclofenac Sodium (VOLTAREN) 1 % Transdermal Gel, Apply 2 g topically 4 (four) times daily., Disp: 1 Tube, Rfl: 5    Levocetirizine Dihydrochloride 5 MG Oral Tab, Take 1 tablet (5 mg total) by mouth every evening., Disp: , Rfl:     Omega-3-acid Ethyl Esters (LOVAZA) 1 G Oral Cap, Take 1 capsule (1 g total) by mouth daily., Disp: , Rfl:     Cholecalciferol (VITAMIN D) 1000 UNITS Oral Tab, Take 1 tablet by mouth 3 (three) times daily.  , Disp: , Rfl:     Multiple Vitamins-Minerals (MULTIVITAMIN & MINERAL OR), Take  by mouth., Disp: , Rfl:     Coenzyme Q10 50 MG Oral Cap, Take  by mouth., Disp: , Rfl:     famotidine 20 MG Oral Tab, Take 1 tablet (20 mg total) by mouth 2 (two) times daily., Disp: , Rfl:     XELJANZ XR 11 MG Oral Tablet 24 Hr, Take 11  mg by mouth daily. (Patient not taking: Reported on 12/3/2024), Disp: , Rfl:     leflunomide 20 MG Oral Tab, Take 1 tablet (20 mg total) by mouth daily., Disp: , Rfl:   PRN:     Allergies:  Allergies[1]       EXAM:  /74 (BP Location: Left arm, Patient Position: Sitting, Cuff Size: adult)   Pulse 84   Resp 16   Ht 66\"   Wt 130 lb (59 kg)   LMP 07/01/2014   BMI 20.98 kg/m²   Looks stated age  General Exam:  HENT:  pink conjunctiva anicteric sclerae  Neck no adenopathy, thyroid normal  Heart and Lungs:  normal  Extremities: no cyanosis, skin changes    NEURO  NEURO  Able to relate events with fluent speech and intact comprehension  CN 2-12: pupils reactive, VF full face symmetric sensation and movement tongue midline  No motor focal findings  Sensory: no lateralizing findings  Reflexes are symmetric  UMN signs: none  Gait: narrow based          INTERPRETATION of RELEVANT LABS and other DATA:          Problem/s Identified this visit:   1. Cervical myofascial pain syndrome    2. Chronic migraine without aura without status migrainosus, not intractable          Discussion plus Diagnostics & Treatment Orders:  She is currently doing better with the Nurtec every other day preventative and using Frova as abortive treatment when there is a headache breakthrough.  She was advised to maintain this treatment and make changes accordingly depending on how the headaches will play out the next several months.      (x) Discussed potential side effects of any treatment relevant to above.  Includes explanation of tests as necessary.    Return in about 4 months (around 4/3/2025).      Patient understands that if needed, based on condition and or test results, follow up will be readjusted      Jordan Diaz MD  Vascular & General Neurology  Director, Multiple Sclerosis Program  [facility]  12/3/2024, Time completed 3:03 PM    Decision making:  ( x ) labs reviewed/ordered - 1  (  ) new diagnosis: - 1  (  x) Images & studies independently reviewed -non F2F  (  ) Case/studies discussed with other caregivers - -non F2F  (  ) Telephone time with patiern or authorized [facility] member--non F2F  ( x ) other records reviewed --non F2F including consultations  (  ) [facility] meetings - patient not present --non F2F  (  ) Independent Historian obtained    Non Face to Face CPT code 85813/75710 applies as documented above    PROCEDURE DONE     (   ) see notes        After visit, patient was escorted out and handed-off discharge process and instructions to the check out desk.  No additional issues relevant to visit were raised to staff at this time interval.        This document is to be interpreted as my current opinion regarding the case as of the stated date of service based on the information available to me at this time and may supersedes any prior opinion expressed either orally or in writing.  Services rendered are only within the scope of direct medical care  Sometimes, reports may have been prepared partially using a speech recognition software technology.  If a word or phrase is confusing or out of context, please do not hesitate to call for clarification.              [1]   Allergies  Allergen Reactions    Dog Dander [Dander]     Latex     Methotrexate OTHER (SEE COMMENTS)    Trees, [place]     Sulfa Antibiotics

## 2024-12-03 NOTE — PATIENT INSTRUCTIONS
Refill policies:    Allow 2-3 business days for refills; controlled substances may take longer.  Contact your pharmacy at least 5 days prior to running out of medication and have them send an electronic request or submit request through the “request refill” option in your [employer] account.  Refills are not addressed on weekends; covering physicians do not authorize routine medications on weekends.  No narcotics or controlled substances are refilled after noon on Fridays or by on call physicians.  By law, narcotics must be electronically prescribed.  A 30 day supply with no refills is the maximum allowed.  If your prescription is due for a refill, you may be due for a follow up appointment.  To best provide you care, patients receiving routine medications need to be seen at least once a year.  Patients receiving narcotic/controlled substance medications need to be seen at least once every 3 months.  In the event that your preferred pharmacy does not have the requested medication in stock (e.g. Backordered), it is your responsibility to find another pharmacy that has the requested medication available.  We will gladly send a new prescription to that pharmacy at your request.    Scheduling Tests:    If your physician has ordered radiology tests such as MRI or CT scans, please contact Central Scheduling at [phone] right away to schedule the test.  Once scheduled, the [facility] Centralized Referral Team will work with your insurance carrier to obtain pre-certification or prior authorization.  Depending on your insurance carrier, approval may take 3-10 days.  It is highly recommended patients assure they have received an authorization before having a test performed.  If test is done without insurance authorization, patient may be responsible for the entire amount billed.      Precertification and Prior Authorizations:  If your physician has recommended that you have a procedure or additional testing performed the [facility]  Centralized Referral Team will contact your insurance carrier to obtain pre-certification or prior authorization.    You are strongly encouraged to contact your insurance carrier to verify that your procedure/test has been approved and is a COVERED benefit.  Although the [facility] Centralized Referral Team does its due diligence, the insurance carrier gives the disclaimer that \"Although the procedure is authorized, this does not guarantee payment.\"    Ultimately the patient is responsible for payment.   Thank you for your understanding in this matter.  Paperwork Completion:  If you require FMLA or disability paperwork for your recovery, please make sure to either drop it off or have it faxed to our office at [phone]. Be sure the form has your name and date of birth on it.  The form will be faxed to our Forms Department and they will complete it for you.  There is a 25$ fee for all forms that need to be filled out.  Please be aware there is a 10-14 day turnaround time.  You will need to sign a release of information (RAKEL) form if your paperwork does not come with one.  You may call the Forms Department with any questions at [phone].  Their fax number is [phone].

## 2025-04-29 ENCOUNTER — OFFICE VISIT (OUTPATIENT)
Dept: NEUROLOGY | Facility: CLINIC | Age: 55
End: 2025-04-29
Payer: COMMERCIAL

## 2025-04-29 VITALS
DIASTOLIC BLOOD PRESSURE: 72 MMHG | SYSTOLIC BLOOD PRESSURE: 113 MMHG | HEIGHT: 66 IN | RESPIRATION RATE: 16 BRPM | HEART RATE: 82 BPM | WEIGHT: 130 LBS | BODY MASS INDEX: 20.89 KG/M2

## 2025-04-29 DIAGNOSIS — G43.709 CHRONIC MIGRAINE WITHOUT AURA WITHOUT STATUS MIGRAINOSUS, NOT INTRACTABLE: Primary | ICD-10-CM

## 2025-04-29 DIAGNOSIS — M79.18 CERVICAL MYOFASCIAL PAIN SYNDROME: ICD-10-CM

## 2025-04-29 PROCEDURE — 3008F BODY MASS INDEX DOCD: CPT | Performed by: OTHER

## 2025-04-29 PROCEDURE — 3078F DIAST BP <80 MM HG: CPT | Performed by: OTHER

## 2025-04-29 PROCEDURE — 99214 OFFICE O/P EST MOD 30 MIN: CPT | Performed by: OTHER

## 2025-04-29 PROCEDURE — 3074F SYST BP LT 130 MM HG: CPT | Performed by: OTHER

## 2025-04-29 RX ORDER — TOFACITINIB 11 MG/1
1 TABLET, FILM COATED, EXTENDED RELEASE ORAL DAILY
COMMUNITY

## 2025-04-29 NOTE — PROGRESS NOTES
NEUROLOGY  [facility]       Tresa Grier  1/22/1970  Primary Care Provider:  Mitch Walsh MD    4/29/2025  55 year old yo,  was last seen on:: Dec 2024    Seen for/plans last visit:  1. Chronic migraine without aura without status migrainosus, not intractable    2. Cervical myofascial pain syndrome          Previous visit and existing record notes reviewed in preparation for the face to face visit.  Relevant labs and studies reviewed and will be noted in relevant areas of this record.  Accompanied visit:     (x) No.      Present condition:  She continues to be relatively okay with the current regimen of Nurtec.  Every other day preventative.  Frova as an abortive treatment gives her relief whenever there is a headache recurrence.    On the days that she is taking Nurtec sometimes she is able to abort an existing ongoing headache    Past History update/new problem(s): No new medical or surgical issues.    Review of Systems:  Review of Systems:  Denies systemic symptoms.     No CP or SOB.  No GI or  symptoms. Relevant Neuro as noted above.      Medications:    Medications - Current[1]  PRN: PRN Medications[2]    Allergies:  Allergies[3]       EXAM:  /72 (BP Location: Left arm, Patient Position: Sitting, Cuff Size: adult)   Pulse 82   Resp 16   Ht 66\"   Wt 130 lb (59 kg)   LMP 07/01/2014   BMI 20.98 kg/m²   Looks stated age  General Exam:  HENT:  pink conjunctiva anicteric sclerae  Neck no adenopathy, thyroid normal  Heart and Lungs:  normal  Etremities: no cyanosis, skin changes*9/    NEURO  Nonfocal neurologic exam.  Same areas of tenderness in the suboccipital and paracervical paraspinals      INTERPRETATION of RELEVANT LABS and other DATA:          Problem/s Identified this visit:   1. Chronic migraine without aura without status migrainosus, not intractable    2. Cervical myofascial pain syndrome          Discussion plus Diagnostics & Treatment  Orders:  Continue present regimen..  State of stable migraine headache is due to be preventative every other day Nurtec regimen which is currently working.      (x) Discussed potential side effects of any treatment relevant to above.  Includes explanation of tests as necessary.    Return in about 7 months (around 11/29/2025).      Patient understands that if needed, based on condition and or test results, follow up will be readjusted      Jordan Diaz MD  Vascular & General Neurology  Director, Multiple Sclerosis Program  [facility]  4/29/2025, Time completed 1:54 PM    Decision making:  ( x ) labs reviewed/ordered - 1  (  ) new diagnosis: - 1  ( x) Images & studies independently reviewed -non F2F  (  ) Case/studies discussed with other caregivers - -non F2F  (  ) Telephone time with patiern or authorized Fam member--non F2F  ( x ) other records reviewed --non F2F including consultations  (  ) [facility] meetings - patient not present --non F2F  (  ) Independent Historian obtained    Non Face to Face CPT code 74675/15437 applies as documented above    PROCEDURE DONE     (   ) see notes        After visit, patient was escorted out and handed-off discharge process and instructions to the check out desk.  No additional issues relevant to visit were raised to staff at this time interval.        This document is to be interpreted as my current opinion regarding the case as of the stated date of service based on the information available to me at this time and may supersedes any prior opinion expressed either orally or in writing.  Services rendered are only within the scope of direct medical care  Sometimes, reports may have been prepared partially using a speech recognition software technology.  If a word or phrase is confusing or out of context, please do not hesitate to call for clarification.              [1]   Current Outpatient Medications:     Tofacitinib Citrate ER (XELJANZ XR) 11 MG Oral Tablet  24 Hr, Take 1 tablet by mouth in the morning., Disp: , Rfl:     celecoxib 200 MG Oral Cap, Take 1 capsule (200 mg total) by mouth 2 (two) times daily as needed., Disp: , Rfl:     leflunomide 20 MG Oral Tab, Take 1 tablet (20 mg total) by mouth daily., Disp: , Rfl:     rosuvastatin 10 MG Oral Tab, Take 1 tablet (10 mg total) by mouth daily., Disp: , Rfl:     Rimegepant Sulfate (NURTEC) 75 MG Oral Tablet Dispersible, DISSOLVE 1 TABLET BY MOUTH EVERY OTHER DAY AS PREVENTATIVE., Disp: 16 tablet, Rfl: 11    TOPIRAMATE 25 MG Oral Tab, TAKE 4 TABLETS(100 MG) BY MOUTH DAILY, Disp: 360 tablet, Rfl: 3    Estradiol 10 MCG Vaginal Tab, INSERT ONE TABLET IN THE VAGINA 3 TIMES A WEEK, Disp: , Rfl:     Frovatriptan Succinate 2.5 MG Oral Tab, TAKE 1 TABLET BY MOUTH AT ONSET OF MIGRAINE AND MAY REPEAT DOSE IN 4 HOURS AS NEEDED. MAXIMUM OF 2 TABLETS IN A 24 HOUR PERIOD., Disp: 36 tablet, Rfl: 3    Clobetasol Propionate 0.05 % External Solution, APPLY TO ITCHING AREA ON SCALP TWICE DAILY AS NEEDED, Disp: 50 mL, Rfl: 1    LEVOTHYROXINE 100 MCG Oral Tab, TAKE 1 TABLET BY MOUTH EVERY DAY AS DIRECTED, Disp: 90 tablet, Rfl: 0    Estradiol (DIVIGEL) 0.75 MG/0.75GM Transdermal Gel, Place 1 Application onto the skin daily., Disp: 90 each, Rfl: 4    SPIRONOLACTONE 50 MG Oral Tab, TAKE 1 TABLET BY MOUTH EVERY DAY, Disp: 90 tablet, Rfl: 3    LIOTHYRONINE 25 MCG Oral Tab, TAKE ONE-HALF TABLET BY MOUTH DAILY, Disp: 90 tablet, Rfl: 0    KETOCONAZOLE 2 % External Shampoo, APPLY 1 ML EXTERNALLY 2 TIMES A WEEK, Disp: 120 mL, Rfl: 3    valACYclovir HCl 1 G Oral Tab, Take 1 tablet (1,000 mg total) by mouth daily., Disp: 90 tablet, Rfl: 3    HYDROcodone-acetaminophen (NORCO) 5-325 MG Oral Tab, Take 1-2 tablets every 6-8 hours as needed for pain., Disp: 30 tablet, Rfl: 0    PROGESTERONE MICRONIZED 100 MG Oral Cap, TAKE 1 CAPSULE(100 MG) BY MOUTH DAILY, Disp: 90 capsule, Rfl: 4    aspirin 81 MG Oral Tab, Take 1 tablet (81 mg total) by mouth daily., Disp: ,  Rfl:     Bimatoprost (LATISSE EX), Apply topically., Disp: , Rfl:     Diclofenac Sodium (VOLTAREN) 1 % Transdermal Gel, Apply 2 g topically 4 (four) times daily., Disp: 1 Tube, Rfl: 5    Levocetirizine Dihydrochloride 5 MG Oral Tab, Take 1 tablet (5 mg total) by mouth every evening., Disp: , Rfl:     Omega-3-acid Ethyl Esters (LOVAZA) 1 G Oral Cap, Take 1 capsule (1 g total) by mouth daily., Disp: , Rfl:     Cholecalciferol (VITAMIN D) 1000 UNITS Oral Tab, Take 1 tablet by mouth 3 (three) times daily.  , Disp: , Rfl:     Multiple Vitamins-Minerals (MULTIVITAMIN & MINERAL OR), Take  by mouth., Disp: , Rfl:     Coenzyme Q10 50 MG Oral Cap, Take  by mouth., Disp: , Rfl:     famotidine 20 MG Oral Tab, Take 1 tablet (20 mg total) by mouth 2 (two) times daily., Disp: , Rfl:   [2] [3]   Allergies  Allergen Reactions    Dog Dander [Dander]     Latex     Methotrexate OTHER (SEE COMMENTS)    Trees, [place]     Sulfa Antibiotics

## 2025-04-29 NOTE — PATIENT INSTRUCTIONS
Refill policies:    Allow 2-3 business days for refills; controlled substances may take longer.  Contact your pharmacy at least 5 days prior to running out of medication and have them send an electronic request or submit request through the “request refill” option in your [employer] account.  Refills are not addressed on weekends; covering physicians do not authorize routine medications on weekends.  No narcotics or controlled substances are refilled after noon on Fridays or by on call physicians.  By law, narcotics must be electronically prescribed.  A 30 day supply with no refills is the maximum allowed.  If your prescription is due for a refill, you may be due for a follow up appointment.  To best provide you care, patients receiving routine medications need to be seen at least once a year.  Patients receiving narcotic/controlled substance medications need to be seen at least once every 3 months.  In the event that your preferred pharmacy does not have the requested medication in stock (e.g. Backordered), it is your responsibility to find another pharmacy that has the requested medication available.  We will gladly send a new prescription to that pharmacy at your request.    Scheduling Tests:    If your physician has ordered radiology tests such as MRI or CT scans, please contact Central Scheduling at [phone] right away to schedule the test.  Once scheduled, the [facility] Centralized Referral Team will work with your insurance carrier to obtain pre-certification or prior authorization.  Depending on your insurance carrier, approval may take 3-10 days.  It is highly recommended patients assure they have received an authorization before having a test performed.  If test is done without insurance authorization, patient may be responsible for the entire amount billed.      Precertification and Prior Authorizations:  If your physician has recommended that you have a procedure or additional testing performed the [facility]  Centralized Referral Team will contact your insurance carrier to obtain pre-certification or prior authorization.    You are strongly encouraged to contact your insurance carrier to verify that your procedure/test has been approved and is a COVERED benefit.  Although the [facility] Centralized Referral Team does its due diligence, the insurance carrier gives the disclaimer that \"Although the procedure is authorized, this does not guarantee payment.\"    Ultimately the patient is responsible for payment.   Thank you for your understanding in this matter.  Paperwork Completion:  If you require FMLA or disability paperwork for your recovery, please make sure to either drop it off or have it faxed to our office at [phone]. Be sure the form has your name and date of birth on it.  The form will be faxed to our Forms Department and they will complete it for you.  There is a 25$ fee for all forms that need to be filled out.  Please be aware there is a 10-14 day turnaround time.  You will need to sign a release of information (RAKEL) form if your paperwork does not come with one.  You may call the Forms Department with any questions at [phone].  Their fax number is [phone].

## 2025-05-06 DIAGNOSIS — G43.709 CHRONIC MIGRAINE WITHOUT AURA WITHOUT STATUS MIGRAINOSUS, NOT INTRACTABLE: ICD-10-CM

## 2025-05-06 RX ORDER — FROVATRIPTAN SUCCINATE 2.5 MG/1
TABLET, FILM COATED ORAL
Qty: 36 TABLET | Refills: 3 | Status: SHIPPED | OUTPATIENT
Start: 2025-05-06 | End: 2025-05-07

## 2025-05-06 NOTE — TELEPHONE ENCOUNTER
Medication: Frovatriptan 2.5 mg     Date of last refill: 12/19/2023 # 36/3  [place]      Last office visit: 04/29/2025  Due back to clinic per last office note:    Date next office visit scheduled:    Future Appointments   Date Time Provider Department Center   8/26/2025  2:40 PM Jordan Diaz MD [facility]           Last OV note recommendation:

## 2025-05-07 ENCOUNTER — PATIENT MESSAGE (OUTPATIENT)
Dept: NEUROLOGY | Facility: CLINIC | Age: 55
End: 2025-05-07

## 2025-05-07 DIAGNOSIS — G43.709 CHRONIC MIGRAINE WITHOUT AURA WITHOUT STATUS MIGRAINOSUS, NOT INTRACTABLE: ICD-10-CM

## 2025-05-07 NOTE — TELEPHONE ENCOUNTER
Spoke w/ pharmacy.  States that insurance will cover #9 tablets per 28 days.    Pending Rx with appropriate amount

## 2025-05-07 NOTE — TELEPHONE ENCOUNTER
PA requested for Frovatriptan  Clinical questions answered and submitted to insurance  Awaiting coverage determination

## 2025-05-08 RX ORDER — FROVATRIPTAN SUCCINATE 2.5 MG/1
TABLET, FILM COATED ORAL
Qty: 9 TABLET | Refills: 11 | Status: SHIPPED | OUTPATIENT
Start: 2025-05-08

## (undated) DIAGNOSIS — G43.709 CHRONIC MIGRAINE WITHOUT AURA WITHOUT STATUS MIGRAINOSUS, NOT INTRACTABLE: ICD-10-CM

## (undated) NOTE — MR AVS SNAPSHOT
[address] [facility] [address] [phone]               Thank you for choosing us for your health care visit with Coleman Wilson MD.  We are glad to serve you and happy to provide you with this summary of your v family member will be picking up prescription, office must be given name of individual in advance and they must present an ID as well. ? The name of the person picking up your prescription must be documented in your chart.   Scheduling Tests    If your phy Latex     Trees, [place]     Sulfa Antibiotics                 Today's Vital Signs     BP Pulse Height Weight BMI    124/60 mmHg 72 65\" 133 lb 22.13 kg/m2         Current Medications          This list is accurate as of: 2/15/17  3:55 PM.  Always use y Commonly known as:  LOVAZA           OnabotulinumtoxinA 200 units Solr   Physician to Inject into selected selected sites in face and neck once every 3 months   Commonly known as:  BOTOX           PATADAY 0.2 % Soln   Generic drug:  Olopatadine HCl   Apply

## (undated) NOTE — MR AVS SNAPSHOT
[address] [facility] [address] [phone]               Thank you for choosing us for your health care visit with Estefania Ocampo MD.  We are glad to serve you and happy to provide you with this summary of your v physically brought to the pharmacy. A 30 day supply with no refills is the maximum allowed. ? If your prescription is due for a refill, you may be due for a follow up appointment.   ? To best provide you care, patients receiving routine medications need t Dog Dander [Dander]     Latex     Trees, [place]     Sulfa Antibiotics                 Today's Vital Signs     BP Pulse                116/74 mmHg 72             Current Medications          This list is accurate as of: 5/17/17  4:35 PM.  Always use you Si units to be administered by MD into multiple sites of head, neck and scalp every 3 months for chronic migraine prophylaxis   Commonly known as:  BOTOX           PATADAY 0.2 % Soln   Generic drug:  Olopatadine HCl   Apply  to eye.            PEPCID